# Patient Record
Sex: FEMALE | Race: WHITE | Employment: FULL TIME | URBAN - METROPOLITAN AREA
[De-identification: names, ages, dates, MRNs, and addresses within clinical notes are randomized per-mention and may not be internally consistent; named-entity substitution may affect disease eponyms.]

---

## 2017-01-31 ENCOUNTER — OFFICE VISIT (OUTPATIENT)
Dept: PEDIATRICS | Facility: CLINIC | Age: 37
End: 2017-01-31
Payer: COMMERCIAL

## 2017-01-31 VITALS
TEMPERATURE: 99.4 F | HEIGHT: 63 IN | DIASTOLIC BLOOD PRESSURE: 70 MMHG | BODY MASS INDEX: 39.16 KG/M2 | OXYGEN SATURATION: 97 % | SYSTOLIC BLOOD PRESSURE: 90 MMHG | WEIGHT: 221 LBS | HEART RATE: 108 BPM

## 2017-01-31 DIAGNOSIS — J02.0 ACUTE STREPTOCOCCAL PHARYNGITIS: Primary | ICD-10-CM

## 2017-01-31 LAB
DEPRECATED S PYO AG THROAT QL EIA: ABNORMAL
MICRO REPORT STATUS: ABNORMAL
SPECIMEN SOURCE: ABNORMAL

## 2017-01-31 PROCEDURE — 99213 OFFICE O/P EST LOW 20 MIN: CPT | Performed by: PHYSICIAN ASSISTANT

## 2017-01-31 PROCEDURE — 87880 STREP A ASSAY W/OPTIC: CPT | Performed by: PHYSICIAN ASSISTANT

## 2017-01-31 RX ORDER — AMOXICILLIN 500 MG/1
500 CAPSULE ORAL 2 TIMES DAILY
Qty: 20 CAPSULE | Refills: 0 | Status: SHIPPED | OUTPATIENT
Start: 2017-01-31 | End: 2017-05-13

## 2017-01-31 NOTE — MR AVS SNAPSHOT
"              After Visit Summary   1/31/2017    Holland Gary    MRN: [id number]           Patient Information     Date Of Birth          1980        Visit Information        Provider Department      1/31/2017 2:10 PM Teena Fields PA-C [facility]        Today's Diagnoses     Acute streptococcal pharyngitis    -  1       Care Instructions    Rapid strep test is positive.     Continue with rest and fluids. May take analgesics for symptomatic relief.    Do not share drinks/food with others. Discard toothbrush in one week.     Return if your symptoms persist or worsen.           Follow-ups after your visit        Who to contact     If you have questions or need follow up information about today's clinic visit or your schedule please contact [facility] directly at [phone].  Normal or non-critical lab and imaging results will be communicated to you by MyChart, letter or phone within 4 business days after the clinic has received the results. If you do not hear from us within 7 days, please contact the clinic through MyChart or phone. If you have a critical or abnormal lab result, we will notify you by phone as soon as possible.  Submit refill requests through Mopio or call your pharmacy and they will forward the refill request to us. Please allow 3 business days for your refill to be completed.          Additional Information About Your Visit        MyChart Information     Mopio lets you send messages to your doctor, view your test results, renew your prescriptions, schedule appointments and more. To sign up, go to [email or web address] . Click on \"Log in\" on the left side of the screen, which will take you to the Welcome page. Then click on \"Sign up Now\" on the right side of the page.     You will be asked to enter the access code listed below, as well as some personal information. Please follow the directions to create your username and password.     Your access " "code is: [email or web address]  Expires: 2017  2:56 PM     Your access code will  in 90 days. If you need help or a new code, please call your [facility] clinic or [phone].        Care EveryWhere ID     This is your Care EveryWhere ID. This could be used by other organizations to access your [facility] medical records  [id number]        Your Vitals Were     Pulse Temperature Height BMI (Body Mass Index) Pulse Oximetry       108 99.4  F (37.4  C) (Oral) 5' 3\" (1.6 m) 39.16 kg/m2 97%        Blood Pressure from Last 3 Encounters:   17 90/70   10/30/16 102/68   16 115/70    Weight from Last 3 Encounters:   17 221 lb (100.245 kg)   10/30/16 219 lb 11.2 oz (99.655 kg)   16 201 lb 6.4 oz (91.354 kg)              We Performed the Following     Strep, Rapid Screen          Today's Medication Changes          These changes are accurate as of: 17  2:56 PM.  If you have any questions, ask your nurse or doctor.               Start taking these medicines.        Dose/Directions    amoxicillin 500 MG capsule   Commonly known as:  AMOXIL   Used for:  Acute streptococcal pharyngitis   Started by:  Teena Fields PA-C        Dose:  500 mg   Take 1 capsule (500 mg) by mouth 2 times daily   Quantity:  20 capsule   Refills:  0            Where to get your medicines      These medications were sent to [facility] [address]   [address], [place] [address]     Phone:  [phone]    - amoxicillin 500 MG capsule             Primary Care Provider Office Phone # Fax #    Mindy Martin [phone]       [address] [address]        Thank you!     Thank you for choosing [facility]  for your care. Our goal is always to provide you with excellent care. Hearing back from our patients is one way we can continue to improve our services. Please take a few minutes to " complete the written survey that you may receive in the mail after your visit with us. Thank you!             Your Updated Medication List - Protect others around you: Learn how to safely use, store and throw away your medicines at www.disposemymeds.org.          This list is accurate as of: 1/31/17  2:56 PM.  Always use your most recent med list.                   Brand Name Dispense Instructions for use    * ADDERALL PO      Take 10 mg by mouth daily       * ADDERALL XR PO      Take 30 mg by mouth daily       amoxicillin 500 MG capsule    AMOXIL    20 capsule    Take 1 capsule (500 mg) by mouth 2 times daily       TYLENOL 325 MG tablet   Generic drug:  acetaminophen      Take 325-650 mg by mouth every 6 hours as needed       VENTOLIN HFA IN          WELLBUTRIN PO          * Notice:  This list has 2 medication(s) that are the same as other medications prescribed for you. Read the directions carefully, and ask your doctor or other care provider to review them with you.

## 2017-01-31 NOTE — NURSING NOTE
"Chief Complaint   Patient presents with     Pharyngitis       Initial BP 90/70 mmHg  Pulse 108  Temp(Src) 99.4  F (37.4  C) (Oral)  Ht 5' 3\" (1.6 m)  Wt 221 lb (100.245 kg)  BMI 39.16 kg/m2  SpO2 97% Estimated body mass index is 39.16 kg/(m^2) as calculated from the following:    Height as of this encounter: 5' 3\" (1.6 m).    Weight as of this encounter: 221 lb (100.245 kg).  BP completed using cuff size: adan Costa MA// January 31, 2017 2:27 PM            "

## 2017-01-31 NOTE — Clinical Note
[address] [address]  [phone]        1/31/2017      Re: Holland Gary      TO WHOM IT MAY CONCERN:    Holland Gary was seen on 1/31/17.  Please excuse her through 2/1/17 due to illness.      Sincerely,          Teena Fields PA-C

## 2017-01-31 NOTE — PROGRESS NOTES
"  SUBJECTIVE:                                                    Holland Gary is a 36 year old female who presents to clinic today for the following health issues:    Acute Illness   Acute illness concerns: sore throat, body aches   Onset: started yesterday     Fever: no, felt warm     Chills/Sweats: YES    Headache (location?): YES    Sinus Pressure:YES    Conjunctivitis:  YES: bilateral    Ear Pain: no    Rhinorrhea: no     Congestion: YES    Sore Throat: YES     Cough: no    Wheeze: no    Decreased Appetite: YES    Nausea: YES    Vomiting: no    Diarrhea:  no    Dysuria/Freq.: no    Fatigue/Achiness: YES    Sick/Strep Exposure: YES-      Therapies Tried and outcome: rest    Problem list, Medication list, Allergies, and Medical/Social/Surgical histories reviewed in [facility] and updated as appropriate.    ROS:  ROS otherwise negative    OBJECTIVE:                                                    BP 90/70 mmHg  Pulse 108  Temp(Src) 99.4  F (37.4  C) (Oral)  Ht 5' 3\" (1.6 m)  Wt 221 lb (100.245 kg)  BMI 39.16 kg/m2  SpO2 97%  Body mass index is 39.16 kg/(m^2).   GENERAL: alert, no distress  HENT: ear canals- normal; TMs- normal; Nose- normal; Mouth-erythemic posterior pharynx; no sinus tenderness   NECK: tonsillar LAD  RESP: lungs clear to auscultation - no rales, no rhonchi, no wheezes  CV: regular rates and rhythm, normal S1 S2, no S3 or S4 and no murmur, no click or rub  ABDOMEN: soft, no tenderness  SKIN: no suspicious lesions, no rashes    Diagnostic test results:  Results for orders placed or performed in visit on 01/31/17 (from the past 24 hour(s))   Strep, Rapid Screen   Result Value Ref Range    Specimen Description Throat     Rapid Strep A Screen (A)      POSITIVE: Group A Streptococcal antigen detected by immunoassay.    Micro Report Status Pending         ASSESSMENT/PLAN:                                                    (J02.0) Acute streptococcal pharyngitis  (primary encounter " diagnosis)  Comment: begin antibiotics. Limit exposures.   Plan: Strep, Rapid Screen, amoxicillin (AMOXIL) 500         MG capsule          See Patient Instructions    Teena Fields PA-C  [facility]

## 2017-05-13 ENCOUNTER — OFFICE VISIT (OUTPATIENT)
Dept: URGENT CARE | Facility: URGENT CARE | Age: 37
End: 2017-05-13
Payer: COMMERCIAL

## 2017-05-13 VITALS
DIASTOLIC BLOOD PRESSURE: 82 MMHG | OXYGEN SATURATION: 99 % | RESPIRATION RATE: 12 BRPM | TEMPERATURE: 98.9 F | HEART RATE: 54 BPM | SYSTOLIC BLOOD PRESSURE: 118 MMHG

## 2017-05-13 DIAGNOSIS — G50.0 TRIGEMINAL NEURALGIA: Primary | ICD-10-CM

## 2017-05-13 PROCEDURE — 99213 OFFICE O/P EST LOW 20 MIN: CPT | Performed by: FAMILY MEDICINE

## 2017-05-13 RX ORDER — CARBAMAZEPINE 200 MG/1
200 TABLET ORAL 2 TIMES DAILY
Qty: 28 TABLET | Refills: 0 | Status: SHIPPED | OUTPATIENT
Start: 2017-05-13 | End: 2019-03-26

## 2017-05-13 RX ORDER — OXYCODONE AND ACETAMINOPHEN 10; 325 MG/1; MG/1
1 TABLET ORAL EVERY 4 HOURS PRN
COMMUNITY
End: 2017-07-10

## 2017-05-13 NOTE — PROGRESS NOTES
SUBJECTIVE:   Holland Gary is a 36 year old female presenting with a chief complaint of right-sided facial pain (shooting, burning pain, severe pain, initially occasional but the pain has become more constant since yesterday).  Onset of symptoms was April 24, 2017.   Course of illness is worsening since yesterday. .    Severity severe.   Current and Associated symptoms: as listed above.     Patient saw a dentist for this right facial pain on May 9, 2017.  The dental exam was normal.  Patient also was evaluated by a physician on April 27, 2017.  Patient was started on Percocet and Augmentin on April 27, 2017.  The pain improved but then returned on May 6, 2017.  .      Current Outpatient Prescriptions   Medication Sig Dispense Refill     amoxicillin-clavulanate (AUGMENTIN) 875-125 MG per tablet Take 1 tablet by mouth 2 times daily       oxyCODONE-acetaminophen (PERCOCET)  MG per tablet Take 1 tablet by mouth every 4 hours as needed for moderate to severe pain       Amphetamine-Dextroamphetamine (ADDERALL PO) Take 10 mg by mouth daily       Amphetamine-Dextroamphetamine (ADDERALL XR PO) Take 30 mg by mouth daily       acetaminophen (TYLENOL) 325 MG tablet Take 325-650 mg by mouth every 6 hours as needed       BuPROPion HCl (WELLBUTRIN PO) Reported on 5/13/2017       Albuterol Sulfate (VENTOLIN HFA IN) Reported on 5/13/2017       Social History   Substance Use Topics     Smoking status: Light Tobacco Smoker     Packs/day: 0.50     Types: Cigarettes     Smokeless tobacco: Never Used     Alcohol use 0.0 oz/week     0 Standard drinks or equivalent per week      Comment: on occ       ROS:  Review of systems negative except as stated above.    OBJECTIVE  :/82 (BP Location: Right arm, Patient Position: Chair, Cuff Size: Adult Large)  Pulse 54  Temp 98.9  F (37.2  C)  Resp 12  SpO2 99%  GENERAL APPEARANCE: healthy, alert and no distress  HENT: No TMJ pain with palpation.  Teeth, gums, Oropharynx, tongue,  buccal mucosa within normal limits.    SKIN: no suspicious lesions or rashes    ASSESSMENT:  Trigeminal Neuralgia    PLAN:  Rx:  Carbamazepine.  follow up with a primary care provider in 7-10 days for further evaluation.  See orders in Epic    Kamari Watts MD

## 2017-05-13 NOTE — MR AVS SNAPSHOT
After Visit Summary   5/13/2017    Holland Gary    MRN: [id number]           Patient Information     Date Of Birth          1980        Visit Information        Provider Department      5/13/2017 2:30 PM Kamari Watts MD [facility]        Today's Diagnoses     Trigeminal neuralgia    -  1      Care Instructions      Follow up with a primary care provider in 7-10 days.   Trigeminal Neuralgia  You have trigeminal neuralgia. This is pain caused by irritation of the trigeminal nerve on your face. Symptoms include sudden, sharp pain in your head or face. It may feel like an electric shock. It can last for several seconds or minutes. It usually happens on only 1 side of your face. Pain may be triggered by things like moving your jaw or a touch on the skin of your face. The pain may be caused by something irritating the trigeminal nerve, such as a blood vessel pressing against it. But the exact cause of this problem often isn t known. Although it can be quite painful, the condition isn t dangerous.  Trigeminal neuralgia is often treated with medications. These include anti-seizure medications or antidepressants. Certain other treatments may also help. In some cases, you may need surgery.    Home care  The health care provider may prescribe medicines to help relieve and prevent pain. Take all medicines as directed. Please note that it may take several changes in dose and medicines before the right combination is found that controls the pain.  General care:    Plan to rest at home today.    Avoid any specific activities that seem to trigger the pain.    Over the next few weeks, keep a pain diary. Write down when your symptoms happen and how they feel. Certain activities such as touching your face, chewing, talking, or brushing your teeth may bring on the pain. Cold air can also trigger the pain. Make sure you write down any triggers and discuss these with your health care provider. This  will help your provider make a plan for your treatment.  Follow-up care  Follow up with your health care provider as advised. If you were referred to a neurologist, be sure to make an appointment.  For more information on your condition, visit::    Facial Pain Association www.fpa-support.org  When to seek medical advice  Call your health care provider right away if any of these occur:    Fever of 100.4 F (38 C) or higher    Headache with very stiff neck    You aren t able to keep liquids down (repeated vomiting)    Extreme drowsiness or confusion    Dizziness or fainting    A new feeling of weakness or numbness or tingling in your arm, leg, or face    Difficulty speaking or seeing       [id number] [employer]. [address] [place] [address], [place] [address]. All rights reserved. This information is not intended as a substitute for professional medical care. Always follow your healthcare professional's instructions.              Follow-ups after your visit        Who to contact     If you have questions or need follow up information about today's clinic visit or your schedule please contact [facility] directly at [phone].  Normal or non-critical lab and imaging results will be communicated to you by Kuraturhart, letter or phone within 4 business days after the clinic has received the results. If you do not hear from us within 7 days, please contact the clinic through testbirdst or phone. If you have a critical or abnormal lab result, we will notify you by phone as soon as possible.  Submit refill requests through [email or web address] or call your pharmacy and they will forward the refill request to us. Please allow 3 business days for your refill to be completed.          Additional Information About Your Visit        [email or web address] Information     [email or web address] lets you send messages to your doctor, view your test results, renew your prescriptions, schedule appointments and more. To sign up, go to  [email or web address] . Click on \"Log in\" on the left side of the screen, which will take you to the Welcome page. Then click on \"Sign up Now\" on the right side of the page.     You will be asked to enter the access code listed below, as well as some personal information. Please follow the directions to create your username and password.     Your access code is: [email or web address]  Expires: 2017  4:19 PM     Your access code will  in 90 days. If you need help or a new code, please call your [facility] clinic or [phone].        Care EveryWhere ID     This is your Care EveryWhere ID. This could be used by other organizations to access your [facility] medical records  [id number]        Your Vitals Were     Pulse Temperature Respirations Pulse Oximetry          54 98.9  F (37.2  C) 12 99%         Blood Pressure from Last 3 Encounters:   17 118/82   17 90/70   10/30/16 102/68    Weight from Last 3 Encounters:   17 221 lb (100.2 kg)   10/30/16 219 lb 11.2 oz (99.7 kg)   16 201 lb 6.4 oz (91.4 kg)              Today, you had the following     No orders found for display         Today's Medication Changes          These changes are accurate as of: 17  4:19 PM.  If you have any questions, ask your nurse or doctor.               Start taking these medicines.        Dose/Directions    carBAMazepine 200 MG tablet   Commonly known as:  TEGRETOL   Used for:  Trigeminal neuralgia   Started by:  Kamari Watts MD        Dose:  200 mg   Take 1 tablet (200 mg) by mouth 2 times daily for 14 days   Quantity:  28 tablet   Refills:  0            Where to get your medicines      These medications were sent to [facility] [address] [facility]   [address] [address]     Phone:  [phone]     carBAMazepine 200 MG tablet                Primary Care Provider Office Phone # Fax #    Mindy STEEN Veronica [phone]       [facility] [address] [place] [address]        Thank you!     Thank you for choosing [facility]  for your care. Our goal is always to provide you with excellent care. Hearing back from our patients is one way we can continue to improve our services. Please take a few minutes to complete the written survey that you may receive in the mail after your visit with us. Thank you!             Your Updated Medication List - Protect others around you: Learn how to safely use, store and throw away your medicines at www.disposemymeds.org.          This list is accurate as of: 5/13/17  4:19 PM.  Always use your most recent med list.                   Brand Name Dispense Instructions for use    * ADDERALL PO      Take 10 mg by mouth daily       * ADDERALL XR PO      Take 30 mg by mouth daily       amoxicillin-clavulanate 875-125 MG per tablet    AUGMENTIN     Take 1 tablet by mouth 2 times daily       carBAMazepine 200 MG tablet    TEGRETOL    28 tablet    Take 1 tablet (200 mg) by mouth 2 times daily for 14 days       oxyCODONE-acetaminophen  MG per tablet    PERCOCET     Take 1 tablet by mouth every 4 hours as needed for moderate to severe pain       TYLENOL 325 MG tablet   Generic drug:  acetaminophen      Take 325-650 mg by mouth every 6 hours as needed       VENTOLIN HFA IN      Reported on 5/13/2017       WELLBUTRIN PO      Reported on 5/13/2017       * Notice:  This list has 2 medication(s) that are the same as other medications prescribed for you. Read the directions carefully, and ask your doctor or other care provider to review them with you.

## 2017-05-13 NOTE — NURSING NOTE
"Chief Complaint   Patient presents with     Urgent Care     Facial Pain     Right sided facial pain. Patient has been to a doctor and dentist. There is still no relief after trying antibiotics and pain medication.      Initial /82 (BP Location: Right arm, Patient Position: Chair, Cuff Size: Adult Large)  Pulse 54  Temp 98.9  F (37.2  C)  Resp 12  SpO2 99% Estimated body mass index is 39.15 kg/(m^2) as calculated from the following:    Height as of 1/31/17: 5' 3\" (1.6 m).    Weight as of 1/31/17: 221 lb (100.2 kg)..  BP completed using cuff size: large  S AL, CMA    "

## 2017-07-10 ENCOUNTER — OFFICE VISIT (OUTPATIENT)
Dept: PEDIATRICS | Facility: CLINIC | Age: 37
End: 2017-07-10
Payer: COMMERCIAL

## 2017-07-10 ENCOUNTER — RADIANT APPOINTMENT (OUTPATIENT)
Dept: GENERAL RADIOLOGY | Facility: CLINIC | Age: 37
End: 2017-07-10
Attending: INTERNAL MEDICINE
Payer: COMMERCIAL

## 2017-07-10 VITALS
HEART RATE: 74 BPM | DIASTOLIC BLOOD PRESSURE: 72 MMHG | SYSTOLIC BLOOD PRESSURE: 100 MMHG | TEMPERATURE: 98.1 F | BODY MASS INDEX: 39.71 KG/M2 | OXYGEN SATURATION: 96 % | HEIGHT: 63 IN | WEIGHT: 224.1 LBS

## 2017-07-10 DIAGNOSIS — G50.0 TRIGEMINAL NEURALGIA: ICD-10-CM

## 2017-07-10 DIAGNOSIS — E66.09 NON MORBID OBESITY DUE TO EXCESS CALORIES: ICD-10-CM

## 2017-07-10 DIAGNOSIS — R63.5 WEIGHT GAIN: ICD-10-CM

## 2017-07-10 DIAGNOSIS — R79.82 ELEVATED C-REACTIVE PROTEIN: Primary | ICD-10-CM

## 2017-07-10 DIAGNOSIS — Z80.3 FAMILY HISTORY OF MALIGNANT NEOPLASM OF BREAST: ICD-10-CM

## 2017-07-10 LAB — ERYTHROCYTE [SEDIMENTATION RATE] IN BLOOD BY WESTERGREN METHOD: 12 MM/H (ref 0–20)

## 2017-07-10 PROCEDURE — 84145 PROCALCITONIN (PCT): CPT | Performed by: INTERNAL MEDICINE

## 2017-07-10 PROCEDURE — 82533 TOTAL CORTISOL: CPT | Performed by: INTERNAL MEDICINE

## 2017-07-10 PROCEDURE — 87389 HIV-1 AG W/HIV-1&-2 AB AG IA: CPT | Performed by: INTERNAL MEDICINE

## 2017-07-10 PROCEDURE — 99215 OFFICE O/P EST HI 40 MIN: CPT | Performed by: INTERNAL MEDICINE

## 2017-07-10 PROCEDURE — 84443 ASSAY THYROID STIM HORMONE: CPT | Performed by: INTERNAL MEDICINE

## 2017-07-10 PROCEDURE — 71020 XR CHEST 2 VW: CPT

## 2017-07-10 PROCEDURE — 36415 COLL VENOUS BLD VENIPUNCTURE: CPT | Performed by: INTERNAL MEDICINE

## 2017-07-10 PROCEDURE — 86140 C-REACTIVE PROTEIN: CPT | Performed by: INTERNAL MEDICINE

## 2017-07-10 PROCEDURE — 85652 RBC SED RATE AUTOMATED: CPT | Performed by: INTERNAL MEDICINE

## 2017-07-10 RX ORDER — CYCLOBENZAPRINE HCL 10 MG
10 TABLET ORAL
COMMUNITY
Start: 2017-05-26 | End: 2019-03-26

## 2017-07-10 RX ORDER — CARBAMAZEPINE 200 MG/1
200 TABLET ORAL PRN
COMMUNITY
End: 2021-11-30

## 2017-07-10 NOTE — MR AVS SNAPSHOT
"              After Visit Summary   7/10/2017    Holladn Gary    MRN: [id number]           Patient Information     Date Of Birth          1980        Visit Information        Provider Department      7/10/2017 2:40 PM [facility] MD [facility]        Today's Diagnoses     Elevated C-reactive protein    -  1    Trigeminal neuralgia        Weight gain          Care Instructions    1. Chest x-ray looks ok  2. Labs today: CRP, Sed rate (longer term measure of inflammation), procalcitonin (should be elevated if infection), HIV, thyroid function and cortisol level  3. If labs otherwise normal and CRP keeps increasing, would consider mammogram with family history of breast cancer vs waiting another month and rechecking.          Follow-ups after your visit        Who to contact     If you have questions or need follow up information about today's clinic visit or your schedule please contact [facility] directly at [phone].  Normal or non-critical lab and imaging results will be communicated to you by MyChart, letter or phone within 4 business days after the clinic has received the results. If you do not hear from us within 7 days, please contact the clinic through Razerhart or phone. If you have a critical or abnormal lab result, we will notify you by phone as soon as possible.  Submit refill requests through [employer] or call your pharmacy and they will forward the refill request to us. Please allow 3 business days for your refill to be completed.          Additional Information About Your Visit        MyChart Information     [employer] lets you send messages to your doctor, view your test results, renew your prescriptions, schedule appointments and more. To sign up, go to [email or web address] . Click on \"Log in\" on the left side of the screen, which will take you to the Welcome page. Then click on \"Sign up Now\" on the right side of the page.     You will be asked to enter the " "access code listed below, as well as some personal information. Please follow the directions to create your username and password.     Your access code is: [email or web address]  Expires: 2017  4:19 PM     Your access code will  in 90 days. If you need help or a new code, please call your [place] clinic or [phone].        Care EveryWhere ID     This is your Care EveryWhere ID. This could be used by other organizations to access your [place] medical records  [id number]        Your Vitals Were     Pulse Temperature Height Pulse Oximetry BMI (Body Mass Index)       74 98.1  F (36.7  C) (Tympanic) 5' 3\" (1.6 m) 96% 39.7 kg/m2        Blood Pressure from Last 3 Encounters:   07/10/17 100/72   17 118/82   17 90/70    Weight from Last 3 Encounters:   07/10/17 224 lb 1.6 oz (101.7 kg)   17 221 lb (100.2 kg)   10/30/16 219 lb 11.2 oz (99.7 kg)              We Performed the Following     Cortisol     CRP inflammation     Erythrocyte sedimentation rate auto     HIV Antigen Antibody Combo     Procalcitonin     TSH with free T4 reflex        Primary Care Provider Office Phone # Fax #    Mindy Martin [phone]       [place] [address]        Equal Access to Services     MUKESH SRINIVASAN : Hadii blossom ku hadasho Sotulioali, waaxda luqadaha, qaybta kaalmada adetaina, letha cannon . So [facility] [phone].    ATENCIÓN: Si habla español, tiene a miller disposición servicios gratuitos de asistencia lingüística. Llame al [phone].    We comply with applicable federal civil rights laws and [place] laws. We do not discriminate on the basis of race, color, national origin, age, disability sex, sexual orientation or gender identity.            Thank you!     Thank you for choosing [facility] [place]  for your care. Our goal is always to provide you with excellent care. Hearing back from our patients is one way we can continue " to improve our services. Please take a few minutes to complete the written survey that you may receive in the mail after your visit with us. Thank you!             Your Updated Medication List - Protect others around you: Learn how to safely use, store and throw away your medicines at www.disposemymeds.org.          This list is accurate as of: 7/10/17  4:19 PM.  Always use your most recent med list.                   Brand Name Dispense Instructions for use Diagnosis    * ADDERALL PO      Take 10 mg by mouth daily        * ADDERALL XR PO      Take 30 mg by mouth daily        * carBAMazepine 200 MG tablet    TEGretol     Take 200 mg by mouth        * carBAMazepine 200 MG tablet    TEGRETOL    28 tablet    Take 1 tablet (200 mg) by mouth 2 times daily for 14 days    Trigeminal neuralgia       cyclobenzaprine 10 MG tablet    FLEXERIL     Take 10 mg by mouth        TYLENOL 325 MG tablet   Generic drug:  acetaminophen      Take 325-650 mg by mouth every 6 hours as needed        VENTOLIN HFA IN      Reported on 5/13/2017        vitamin D3 1000 UNITS Caps      Take 2,000 Units by mouth        * Notice:  This list has 4 medication(s) that are the same as other medications prescribed for you. Read the directions carefully, and ask your doctor or other care provider to review them with you.
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
## 2017-07-10 NOTE — PATIENT INSTRUCTIONS
1. Chest x-ray looks ok  2. Labs today: CRP, Sed rate (longer term measure of inflammation), procalcitonin (should be elevated if infection), HIV, thyroid function and cortisol level  3. If labs otherwise normal and CRP keeps increasing, would consider mammogram with family history of breast cancer vs waiting another month and rechecking.

## 2017-07-10 NOTE — NURSING NOTE
"Chief Complaint   Patient presents with     RECHECK     CRP levels       Initial /72 (BP Location: Right arm, Patient Position: Chair, Cuff Size: Adult Large)  Pulse 74  Temp 98.1  F (36.7  C) (Tympanic)  Ht 5' 3\" (1.6 m)  Wt 224 lb 1.6 oz (101.7 kg)  SpO2 96%  BMI 39.7 kg/m2 Estimated body mass index is 39.7 kg/(m^2) as calculated from the following:    Height as of this encounter: 5' 3\" (1.6 m).    Weight as of this encounter: 224 lb 1.6 oz (101.7 kg).  Medication Reconciliation: complete   Marti Mims LPN      "

## 2017-07-11 PROBLEM — E66.09 NON MORBID OBESITY DUE TO EXCESS CALORIES: Status: ACTIVE | Noted: 2017-07-11

## 2017-07-11 LAB
CORTIS SERPL-MCNC: 4.4 UG/DL (ref 4–22)
CRP SERPL-MCNC: 13 MG/L (ref 0–8)
HIV 1+2 AB+HIV1 P24 AG SERPL QL IA: NORMAL
PROCALCITONIN SERPL-MCNC: <0.05 NG/ML

## 2017-07-13 LAB — TSH SERPL DL<=0.005 MIU/L-ACNC: 1.94 MU/L (ref 0.4–4)

## 2017-07-15 ENCOUNTER — HEALTH MAINTENANCE LETTER (OUTPATIENT)
Age: 37
End: 2017-07-15

## 2017-07-17 PROBLEM — Z80.3 FAMILY HISTORY OF MALIGNANT NEOPLASM OF BREAST: Status: ACTIVE | Noted: 2017-07-17

## 2017-12-27 ENCOUNTER — OFFICE VISIT (OUTPATIENT)
Dept: PEDIATRICS | Facility: CLINIC | Age: 37
End: 2017-12-27
Payer: COMMERCIAL

## 2017-12-27 VITALS
HEIGHT: 65 IN | OXYGEN SATURATION: 94 % | SYSTOLIC BLOOD PRESSURE: 116 MMHG | RESPIRATION RATE: 14 BRPM | BODY MASS INDEX: 39.1 KG/M2 | WEIGHT: 234.7 LBS | DIASTOLIC BLOOD PRESSURE: 72 MMHG | TEMPERATURE: 98.2 F | HEART RATE: 78 BPM

## 2017-12-27 DIAGNOSIS — J20.9 ACUTE BRONCHITIS, UNSPECIFIED ORGANISM: Primary | ICD-10-CM

## 2017-12-27 PROCEDURE — 99214 OFFICE O/P EST MOD 30 MIN: CPT | Performed by: PHYSICIAN ASSISTANT

## 2017-12-27 RX ORDER — AZITHROMYCIN 250 MG/1
TABLET, FILM COATED ORAL
Qty: 6 TABLET | Refills: 0 | Status: SHIPPED | OUTPATIENT
Start: 2017-12-27 | End: 2018-04-19

## 2017-12-27 NOTE — LETTER
December 27, 2017      Holland Gary  [address] [place] [address]        To Whom It May Concern:    Holland Gary  was seen on 12/27/17.  Please excuse her through 12/28/17 due to illness.        Sincerely,        Teena Fields PA-C

## 2017-12-27 NOTE — PROGRESS NOTES
"  SUBJECTIVE:   Holland Gary is a 37 year old female who presents to clinic today for the following health issues:    Acute Illness   Acute illness concerns: URI symptoms  Onset: 2 days ago    Fever: no    Chills/Sweats: YES    Headache (location?): YES    Sinus Pressure:YES- tender, nasal drip    Conjunctivitis:  no    Ear Pain: YES: right    Rhinorrhea: YES- slight    Congestion: YES    Sore Throat: YES     Cough: YES-non-productive, barking    Wheeze: no    No sob    Decreased Appetite: YES    Nausea: no    Vomiting: no    Diarrhea:  no    Dysuria/Freq.: no    Fatigue/Achiness: YES    Sick/Strep Exposure: YES- small child with cough and runny nose     Therapies Tried and outcome: dayquil, sinus medication (unsure which) cough drops, dayquil helped the most    No history of asthma, allergies.   Smoker.   Works in HR--office setting.     ROS:  ROS otherwise negative    OBJECTIVE:                                                    /72 (BP Location: Right arm, Patient Position: Chair, Cuff Size: Adult Large)  Pulse 78  Temp 98.2  F (36.8  C) (Oral)  Resp 14  Ht 5' 5\" (1.651 m)  Wt 234 lb 11.2 oz (106.5 kg)  SpO2 94%  BMI 39.06 kg/m2  Body mass index is 39.06 kg/(m^2).   GENERAL: alert, no distress  HENT: ear canals- normal; TMs- normal; Nose- normal; Mouth- no ulcers, no lesions  NECK: no tenderness, no adenopathy  RESP: mild-mod diminished breath sounds- no rales, no rhonchi, no wheezes  CV: regular rates and rhythm, normal S1 S2, no S3 or S4 and no murmur, no click or rub    Diagnostic test results:  No results found for this or any previous visit (from the past 24 hour(s)).       ASSESSMENT/PLAN:                                                    (J20.9) Acute bronchitis, unspecified organism  (primary encounter diagnosis)  Comment: proceed with antibiotics if symptoms persist or worsen considerably. Patient in agreement with plan.  Plan: azithromycin (ZITHROMAX) 250 MG tablet          See Patient " Instructions    Teena Fields PA-C  [facility]

## 2017-12-27 NOTE — PATIENT INSTRUCTIONS
Acute Bronchitis                  What is acute bronchitis?   Bronchitis is an infection of the air passages--that is, the tubes that connect the windpipe to the lungs. It causes swelling and irritation of the airways. With acute bronchitis you usually have a cough that produces phlegm and pain behind the breastbone when you breathe deeply or cough.   How does it occur?   Bronchitis often occurs with viral infections of the respiratory tract, such as colds and flu. Bronchitis may also be caused by bacterial infections. It may occur with childhood illnesses such as measles and whooping cough.   Attacks are most frequent during the winter or when the level of air pollution is high.   Infants, young children, older adults, smokers, and people with heart disease or lung disease (including asthma and allergies) are most likely to get acute bronchitis.   What are the symptoms?   Symptoms may include:   a deep cough that produces yellowish or greenish phlegm   pain behind the breastbone when you breathe deeply or cough   wheezing   feeling short of breath   fever   chills   headache   sore muscles.   How is it diagnosed?   Your healthcare provider will ask about your symptoms and examine you. You may have tests, such as:   a test of phlegm to look for bacteria   chest X-ray   blood tests.   How is it treated?   Acute bronchitis often does not require medical treatment. Resting at home and drinking plenty of fluids to keep the mucus loose may be all you need to do to get better in a few days. If your symptoms are severe or you have other health problems (such as heart or lung disease or diabetes), you may need to take antibiotics.   How long will the effects last?   Most of the time acute bronchitis clears up in a few days. Your cough may slowly get better in 1 to 2 weeks.   It may take you longer to recover if:   You are a smoker.   You live in an area where air pollution is a problem.   You have a heart  or lung disease.   You have any other continuing health problems.   How can I take care of myself?   You can help yourself by:   following the full treatment your healthcare provider recommends   using a vaporizer, humidifier, or steam from hot water to add moisture to the air   drinking plenty of liquids   taking cough medicine if recommended by your healthcare provider   resting in bed   taking aspirin or acetaminophen to reduce fever and relieve headache and muscle pain (Check with your healthcare provider before you give any medicine that contains aspirin or salicylates to a child or teen. This includes medicines like baby aspirin, some cold medicines, and Pepto Bismol. Children and teens who take aspirin are at risk for a serious illness called Reye's syndrome.)   eating healthy meals.   Call your healthcare provider if:   You have trouble breathing.   You have a fever of 101.5?F (38.6?C) or higher.   You cough up blood.   Your symptoms are getting worse instead of better.   You don't start to feel better after 3 days of treatment.   You have any symptoms that concern you.   How can I help prevent acute bronchitis?   To reduce your risk of getting a respiratory infection:   Do not smoke.   Wash your hands often, especially when you are around people with colds (upper respiratory infections).   If you have asthma or allergies, keep your symptoms under good control.   Get regular exercise.   Eat healthy foods.       Published by [employer].  This content is reviewed periodically and is subject to change as new health information becomes available. The information is intended to inform and educate and is not a replacement for medical evaluation, advice, diagnosis or treatment by a healthcare professional.   Developed by [employer].   ? 2010 [employer] and/or its affiliates. All Rights Reserved.   Copyright   Clinical Reference Systems 2011

## 2017-12-27 NOTE — MR AVS SNAPSHOT
After Visit Summary   12/27/2017    Holland Gary    MRN: [id number]           Patient Information     Date Of Birth          1980        Visit Information        Provider Department      12/27/2017 1:50 PM Teena Fields PA-C [facility]        Today's Diagnoses     Acute bronchitis, unspecified organism    -  1      Care Instructions                       Acute Bronchitis                  What is acute bronchitis?   Bronchitis is an infection of the air passages--that is, the tubes that connect the windpipe to the lungs. It causes swelling and irritation of the airways. With acute bronchitis you usually have a cough that produces phlegm and pain behind the breastbone when you breathe deeply or cough.   How does it occur?   Bronchitis often occurs with viral infections of the respiratory tract, such as colds and flu. Bronchitis may also be caused by bacterial infections. It may occur with childhood illnesses such as measles and whooping cough.   Attacks are most frequent during the winter or when the level of air pollution is high.   Infants, young children, older adults, smokers, and people with heart disease or lung disease (including asthma and allergies) are most likely to get acute bronchitis.   What are the symptoms?   Symptoms may include:   a deep cough that produces yellowish or greenish phlegm   pain behind the breastbone when you breathe deeply or cough   wheezing   feeling short of breath   fever   chills   headache   sore muscles.   How is it diagnosed?   Your healthcare provider will ask about your symptoms and examine you. You may have tests, such as:   a test of phlegm to look for bacteria   chest X-ray   blood tests.   How is it treated?   Acute bronchitis often does not require medical treatment. Resting at home and drinking plenty of fluids to keep the mucus loose may be all you need to do to get better in a few days. If your symptoms are severe or you  have other health problems (such as heart or lung disease or diabetes), you may need to take antibiotics.   How long will the effects last?   Most of the time acute bronchitis clears up in a few days. Your cough may slowly get better in 1 to 2 weeks.   It may take you longer to recover if:   You are a smoker.   You live in an area where air pollution is a problem.   You have a heart or lung disease.   You have any other continuing health problems.   How can I take care of myself?   You can help yourself by:   following the full treatment your healthcare provider recommends   using a vaporizer, humidifier, or steam from hot water to add moisture to the air   drinking plenty of liquids   taking cough medicine if recommended by your healthcare provider   resting in bed   taking aspirin or acetaminophen to reduce fever and relieve headache and muscle pain (Check with your healthcare provider before you give any medicine that contains aspirin or salicylates to a child or teen. This includes medicines like baby aspirin, some cold medicines, and Pepto Bismol. Children and teens who take aspirin are at risk for a serious illness called Reye's syndrome.)   eating healthy meals.   Call your healthcare provider if:   You have trouble breathing.   You have a fever of 101.5?F (38.6?C) or higher.   You cough up blood.   Your symptoms are getting worse instead of better.   You don't start to feel better after 3 days of treatment.   You have any symptoms that concern you.   How can I help prevent acute bronchitis?   To reduce your risk of getting a respiratory infection:   Do not smoke.   Wash your hands often, especially when you are around people with colds (upper respiratory infections).   If you have asthma or allergies, keep your symptoms under good control.   Get regular exercise.   Eat healthy foods.       Published by [employer].  This content is reviewed periodically and is subject to change as new health information  "becomes available. The information is intended to inform and educate and is not a replacement for medical evaluation, advice, diagnosis or treatment by a healthcare professional.   Developed by [employer].   ? 2010 [employer] and/or its affiliates. All Rights Reserved.   WalkMe   Clinical [employer] 2011                  Follow-ups after your visit        Who to contact     If you have questions or need follow up information about today's clinic visit or your schedule please contact [facility] directly at [phone].  Normal or non-critical lab and imaging results will be communicated to you by [employer], letter or phone within 4 business days after the clinic has received the results. If you do not hear from us within 7 days, please contact the clinic through GetNotest or phone. If you have a critical or abnormal lab result, we will notify you by phone as soon as possible.  Submit refill requests through [employer] or call your pharmacy and they will forward the refill request to us. Please allow 3 business days for your refill to be completed.          Additional Information About Your Visit        Nom[employer]ity Information     [employer] gives you secure access to your electronic health record. If you see a primary care provider, you can also send messages to your care team and make appointments. If you have questions, please call your primary care clinic.  If you do not have a primary care provider, please call [phone] and they will assist you.        Care EveryWhere ID     This is your Care EveryWhere ID. This could be used by other organizations to access your [facility] medical records  [id number]        Your Vitals Were     Pulse Temperature Respirations Height Pulse Oximetry BMI (Body Mass Index)    78 98.2  F (36.8  C) (Oral) 14 5' 5\" (1.651 m) 94% 39.06 kg/m2       Blood Pressure from Last 3 Encounters:   12/27/17 116/72   07/10/17 100/72   05/13/17 118/82    Weight from Last 3 Encounters: "   12/27/17 234 lb 11.2 oz (106.5 kg)   07/10/17 224 lb 1.6 oz (101.7 kg)   01/31/17 221 lb (100.2 kg)              Today, you had the following     No orders found for display         Today's Medication Changes          These changes are accurate as of: 12/27/17  2:15 PM.  If you have any questions, ask your nurse or doctor.               Start taking these medicines.        Dose/Directions    azithromycin 250 MG tablet   Commonly known as:  ZITHROMAX   Used for:  Acute bronchitis, unspecified organism   Started by:  Teena Fields PA-C        Two tablets first day, then one tablet daily for four days.   Quantity:  6 tablet   Refills:  0            Where to get your medicines      These medications were sent to [id number] - [place], [place] - [address] AT [facility] & [facility]  [address] [place] [address]     Phone:  [phone]     azithromycin 250 MG tablet                Primary Care Provider Office Phone # Fax #    Mindy STEEN Veronica [phone]       [address] [address]        Equal Access to Services     [facility]: Hadii aad rocio hadasho Sotulioali, waaxda luqadaha, qaybta kaalmada adeegyada, letha cannon . So [facility] [phone].    ATENCIÓN: Si habla español, tiene a miller disposición servicios gratuitos de asistencia lingüística. Llame al [phone].    We comply with applicable federal civil rights laws and [place] laws. We do not discriminate on the basis of race, color, national origin, age, disability, sex, sexual orientation, or gender identity.            Thank you!     Thank you for choosing [facility]  for your care. Our goal is always to provide you with excellent care. Hearing back from our patients is one way we can continue to improve our services. Please take a few minutes to complete the written survey that you may receive in the mail after your visit with us. Thank  you!             Your Updated Medication List - Protect others around you: Learn how to safely use, store and throw away your medicines at www.disposemymeds.org.          This list is accurate as of: 12/27/17  2:15 PM.  Always use your most recent med list.                   Brand Name Dispense Instructions for use Diagnosis    * ADDERALL PO      Take 10 mg by mouth daily        * ADDERALL XR PO      Take 30 mg by mouth daily        azithromycin 250 MG tablet    ZITHROMAX    6 tablet    Two tablets first day, then one tablet daily for four days.    Acute bronchitis, unspecified organism       carBAMazepine 200 MG tablet    TEGretol     Take 200 mg by mouth        cyclobenzaprine 10 MG tablet    FLEXERIL     Take 10 mg by mouth        TYLENOL 325 MG tablet   Generic drug:  acetaminophen      Take 325-650 mg by mouth every 6 hours as needed        VENTOLIN HFA IN      Reported on 5/13/2017        vitamin D3 1000 UNITS Caps      Take 2,000 Units by mouth        * Notice:  This list has 2 medication(s) that are the same as other medications prescribed for you. Read the directions carefully, and ask your doctor or other care provider to review them with you.

## 2017-12-27 NOTE — NURSING NOTE
"Chief Complaint   Patient presents with     URI       Initial /72 (BP Location: Right arm, Patient Position: Chair, Cuff Size: Adult Large)  Pulse 78  Temp 98.2  F (36.8  C) (Oral)  Resp 14  Ht 5' 5\" (1.651 m)  Wt 234 lb 11.2 oz (106.5 kg)  SpO2 94%  BMI 39.06 kg/m2 Estimated body mass index is 39.06 kg/(m^2) as calculated from the following:    Height as of this encounter: 5' 5\" (1.651 m).    Weight as of this encounter: 234 lb 11.2 oz (106.5 kg).  Medication Reconciliation: complete  Carmen Weller CMA  "

## 2018-04-19 ENCOUNTER — OFFICE VISIT (OUTPATIENT)
Dept: ORTHOPEDICS | Facility: CLINIC | Age: 38
End: 2018-04-19
Payer: COMMERCIAL

## 2018-04-19 ENCOUNTER — RADIANT APPOINTMENT (OUTPATIENT)
Dept: GENERAL RADIOLOGY | Facility: CLINIC | Age: 38
End: 2018-04-19
Attending: FAMILY MEDICINE
Payer: COMMERCIAL

## 2018-04-19 VITALS
WEIGHT: 230 LBS | HEIGHT: 65 IN | SYSTOLIC BLOOD PRESSURE: 132 MMHG | BODY MASS INDEX: 38.32 KG/M2 | DIASTOLIC BLOOD PRESSURE: 80 MMHG

## 2018-04-19 DIAGNOSIS — M25.562 ARTHRALGIA OF LEFT LOWER LEG: Primary | ICD-10-CM

## 2018-04-19 DIAGNOSIS — M21.42 PES PLANUS OF LEFT FOOT: ICD-10-CM

## 2018-04-19 DIAGNOSIS — M25.562 ARTHRALGIA OF LEFT LOWER LEG: ICD-10-CM

## 2018-04-19 DIAGNOSIS — M22.2X2 PATELLOFEMORAL PAIN SYNDROME OF LEFT KNEE: ICD-10-CM

## 2018-04-19 DIAGNOSIS — R29.898 WEAKNESS OF LEFT HIP: ICD-10-CM

## 2018-04-19 PROCEDURE — 99203 OFFICE O/P NEW LOW 30 MIN: CPT | Performed by: FAMILY MEDICINE

## 2018-04-19 PROCEDURE — 73562 X-RAY EXAM OF KNEE 3: CPT

## 2018-04-19 RX ORDER — ESCITALOPRAM OXALATE 10 MG/1
10 TABLET ORAL
COMMUNITY
Start: 2018-02-06 | End: 2019-08-20

## 2018-04-19 NOTE — MR AVS SNAPSHOT
After Visit Summary   4/19/2018    Holland Gary    MRN: [id number]           Patient Information     Date Of Birth          1980        Visit Information        Provider Department      4/19/2018 9:20 AM Gamaliel Ryan, DO [facility] SP[facility]TS MEDIC[facility]        Today's Diagnoses     Arthralgia of left lower leg    -  1    Weakness of left hip        Patellofemoral pain syndrome of left knee        Pes planus of left foot          Care Instructions    1. Arthralgia of left lower leg    2. Weakness of left hip    3. Patellofemoral pain syndrome of left knee    4. Pes planus of left foot      Discussed xray - no significant arthritis  Discussed exam - pain due to weakness in thigh, hip and buttock. Knee is otherwise stable and no fluid in the joint  Activity modification as discussed - squats, lunges, stair, sit-to-stand and kneeling will bother you  Physical therapy: [facility] - [phone]  Can take up to 8 weeks to change your strength  Respect 6/10 pain - before, during, after    Follow up after 8 PT sessions  or sooner if needed. Call direct clinic number [[phone]] at any time with questions or concerns.    NEW WEBSITE HAS LAUNCHED  [email or web address]    We care about your feedback and use it to improve our services. Please leave feedback on the Testimonials & Reviews page.              Follow-ups after your visit        Additional Services     MAU PT, HAND, AND CHIROPRACTIC REFERRAL       **This order will print in the [facility] Scheduling Office**    Physical Therapy, Hand Therapy and Chiropractic Care are available through:    *[facility]  *[facility]  *[facility]    Call one number to schedule at any of the above locations: [phone].    Your provider has referred you to: Physical Therapy at [facility] or [facility]    Indication/Reason for Referral: Left PFPS - address hip stability / VMO. Also has b/l  pes planus with poor ankle proprioception  Onset of Illness: see chart  Therapy Orders: Evaluate and Treat  Special Programs: None  Special Request: kinesioptaping.    Melecio Majano      Additional Comments for the Therapist or Chiropractor: Formal physical therapy - exercises to include neuromuscular/proprioceptive control and VMO/adductor / glutues strengthening. Please also include pain-free closed chain/isometric/isotonic strengthening with use of modalities (including kinesioptaping) as needed/deemed appropriate with home exercise prescription.      Please be aware that coverage of these services is subject to the terms and limitations of your health insurance plan.  Call member services at your health plan with any benefit or coverage questions.      Please bring the following to your appointment:    *Your personal calendar for scheduling future appointments  *Comfortable clothing                  Who to contact     If you have questions or need follow up information about today's clinic visit or your schedule please contact [facility] SP[facility]TS MEDICINE directly at [phone].  Normal or non-critical lab and imaging results will be communicated to you by [employer], letter or phone within 4 business days after the clinic has received the results. If you do not hear from us within 7 days, please contact the clinic through [employer] or phone. If you have a critical or abnormal lab result, we will notify you by phone as soon as possible.  Submit refill requests through [employer] or call your pharmacy and they will forward the refill request to us. Please allow 3 business days for your refill to be completed.          Additional Information About Your Visit        [employer] Information     [employer] gives you secure access to your electronic health record. If you see a primary care provider, you can also send messages to your care team and make appointments. If you have questions, please call your primary care clinic.   "If you do not have a primary care provider, please call [phone] and they will assist you.        Care EveryWhere ID     This is your Care EveryWhere ID. This could be used by other organizations to access your [facility] medical records  [id number]        Your Vitals Were     Height BMI (Body Mass Index)                5' 5\" (1.651 m) 38.27 kg/m2           Blood Pressure from Last 3 Encounters:   04/19/18 132/80   12/27/17 116/72   07/10/17 100/72    Weight from Last 3 Encounters:   04/19/18 230 lb (104.3 kg)   12/27/17 234 lb 11.2 oz (106.5 kg)   07/10/17 224 lb 1.6 oz (101.7 kg)              We Performed the Following     MAU PT, HAND, AND CHIROPRACTIC REFERRAL          Today's Medication Changes          These changes are accurate as of 4/19/18 10:29 AM.  If you have any questions, ask your nurse or doctor.               Stop taking these medicines if you haven't already. Please contact your care team if you have questions.     azithromycin 250 MG tablet   Commonly known as:  ZITHROMAX   Stopped by:  Gamaliel Ryan,                     Primary Care Provider Office Phone # Fax #    Mindy STEEN Martin [phone]       [place] [address]        Equal Access to Services     MUKESH SRINIVASAN AH: Hadii blossom ku hadasho Sotulioali, waaxda luqadaha, qaybta kaalmada talib, letha drake. So [facility] [phone].    ATENCIÓN: Si habla español, tiene a imller disposición servicios gratuitos de asistencia lingüística. Llame al [phone].    We comply with applicable federal civil rights laws and [place] laws. We do not discriminate on the basis of race, color, national origin, age, disability, sex, sexual orientation, or gender identity.            Thank you!     Thank you for choosing [facility] [place]  for your care. Our goal is always to provide you with excellent care. Hearing back from our patients is one way we can " continue to improve our services. Please take a few minutes to complete the written survey that you may receive in the mail after your visit with us. Thank you!             Your Updated Medication List - Protect others around you: Learn how to safely use, store and throw away your medicines at www.disposemymeds.org.          This list is accurate as of 4/19/18 10:29 AM.  Always use your most recent med list.                   Brand Name Dispense Instructions for use Diagnosis    * ADDERALL PO      Take 10 mg by mouth daily        * ADDERALL XR PO      Take 30 mg by mouth daily        carBAMazepine 200 MG tablet    TEGretol     Take 200 mg by mouth        cyclobenzaprine 10 MG tablet    FLEXERIL     Take 10 mg by mouth        escitalopram 10 MG tablet    LEXAPRO     Take 10 mg by mouth        TYLENOL 325 MG tablet   Generic drug:  acetaminophen      Take 325-650 mg by mouth every 6 hours as needed        VENTOLIN HFA IN      Reported on 5/13/2017        vitamin D3 1000 units Caps      Take 2,000 Units by mouth        * Notice:  This list has 2 medication(s) that are the same as other medications prescribed for you. Read the directions carefully, and ask your doctor or other care provider to review them with you.

## 2018-04-19 NOTE — LETTER
4/19/2018         RE: Holland Gary  [address]  [place] [address]        Dear Colleague,    Thank you for referring your patient, Holland Gary, to the [facility] SPORTS MEDICINE. Please see a copy of my visit note below.    ASSESSMENT & PLAN    1. Arthralgia of left lower leg    2. Weakness of left hip    3. Patellofemoral pain syndrome of left knee    4. Pes planus of left foot      Discussed xray - no significant arthritis  Discussed exam - pain due to weakness in thigh, hip and buttock. Knee is otherwise stable and no fluid in the joint  Activity modification as discussed - squats, lunges, stair, sit-to-stand and kneeling will bother you  Physical therapy: [facility] - [phone]  Can take up to 8 weeks to change your strength  Respect 6/10 pain - before, during, after    Follow up after 8 PT sessions  or sooner if needed. Call direct clinic number [[phone]] at any time with questions or concerns.    -----    SUBJECTIVE  Holland Gary is a/an 37 year old female who is seen as a self referral for evaluation of left knee pain. The patient is seen by themselves.    Onset: 1-2 month(s) ago. Reports insidious onset without acute precipitating event.  Location of Pain: left medial knee with radiation to the lower leg, also feeling pain on the underside of the patella  Rating of Pain at worst: 7/10  Rating of Pain Currently: 2/10  Worsened by: stairs, crossing her legs, walking on uneven ground  Better with: rest  Treatments tried: bracing, topical ointments  Associated symptoms: possible posterior knee swelling  Orthopedic history:  pes planus, restless leg syndrome, seen at [facility] last year for similar knee issues and diagnosed with Bakers Cyst and referred for MRI but patient didn't complete  Relevant surgical history: NO  Patient Social History: works in [employer] job    Patient's past medical, surgical, social, and family histories were reviewed today and no  "changes are noted.    REVIEW OF SYSTEMS:  10 point ROS is negative other than symptoms noted above in HPI, Past Medical History or as stated below  Constitutional: NEGATIVE for fever, chills, change in weight  Skin: NEGATIVE for worrisome rashes, moles or lesions  GI/: NEGATIVE for bowel or bladder changes  Neuro: NEGATIVE for weakness, dizziness or paresthesias    OBJECTIVE:  /80  Ht 5' 5\" (1.651 m)  Wt 230 lb (104.3 kg)  BMI 38.27 kg/m2   General: healthy, alert and in no distress  HEENT: no scleral icterus or conjunctival erythema  Skin: no suspicious lesions or rash. No jaundice.  CV: no pedal edema  Resp: normal respiratory effort without conversational dyspnea   Psych: normal mood and affect  Gait: normal steady gait with appropriate coordination and balance  Neuro: Normal light sensory exam of lower extremity  MSK:  LEFT KNEE  Inspection:    normal alignment, pes planus, increased knee valgus with one leg squat  Palpation:    Tender about the medial patellar facet. Remainder of bony and ligamentous landmarks are nontender.    No effusion is present    Patellofemoral crepitus is Present  Range of Motion:     00 extension to 1350 flexion  Strength:    Quadriceps and gluteus weakness    Extensor mechanism intact  Special Tests:    Positive: Patellar grind    Negative: MCL/valgus stress (0 & 30 deg), LCL/varus stress (0 & 30 deg), Lachman's, posterior drawer, Jeffery's    Independent visualization of the below image:  Recent Results (from the past 24 hour(s))   XR Knee Standing AP Bilat [place] Bilat Lat Left    Narrative    XR KNEE STANDING AP BILAT [place] BILAT LAT LT, 4/19/2018 10:04 AM    Comparison: None    History: Left lower leg arthralgia.    Findings:   Right knee: Evaluation is limited to the anterior and patellar sunrise  projections. Joint spaces are preserved. No fractures are seen.    Left knee: Joint spaces are preserved. No fractures are seen. No knee  effusion identified.      " Impression    Impression: No acute bone abnormality or significant arthropathy  identified.     OLIVIA SANCHEZ MD     Patient's conditions were thoroughly discussed during today's visit with greater than 50% of the visit spent counseling the patient with total time spent face-to-face with the patient being 25 minutes.    Gamaliel Ryan DO [facility]      Again, thank you for allowing me to participate in the care of your patient.        Sincerely,        Gamaliel Ryan DO

## 2018-04-19 NOTE — PATIENT INSTRUCTIONS
1. Arthralgia of left lower leg    2. Weakness of left hip    3. Patellofemoral pain syndrome of left knee    4. Pes planus of left foot      Discussed xray - no significant arthritis  Discussed exam - pain due to weakness in thigh, hip and buttock. Knee is otherwise stable and no fluid in the joint  Activity modification as discussed - squats, lunges, stair, sit-to-stand and kneeling will bother you  Physical therapy: [facility] - [phone]  Can take up to 8 weeks to change your strength  Respect 6/10 pain - before, during, after    Follow up after 8 PT sessions  or sooner if needed. Call direct clinic number [[phone]] at any time with questions or concerns.    NEW WEBSITE HAS LAUNCHED  [email or web address]    We care about your feedback and use it to improve our services. Please leave feedback on the Testimonials & Reviews page.

## 2018-04-19 NOTE — PROGRESS NOTES
ASSESSMENT & PLAN    1. Arthralgia of left lower leg    2. Weakness of left hip    3. Patellofemoral pain syndrome of left knee    4. Pes planus of left foot      Discussed xray - no significant arthritis  Discussed exam - pain due to weakness in thigh, hip and buttock. Knee is otherwise stable and no fluid in the joint  Activity modification as discussed - squats, lunges, stair, sit-to-stand and kneeling will bother you  Physical therapy: [facility] - [phone]  Can take up to 8 weeks to change your strength  Respect 6/10 pain - before, during, after    Follow up after 8 PT sessions  or sooner if needed. Call direct clinic number [[phone]] at any time with questions or concerns.    -----    SUBJECTIVE  Obraulio MARLY Gary is a/an 37 year old female who is seen as a self referral for evaluation of left knee pain. The patient is seen by themselves.    Onset: 1-2 month(s) ago. Reports insidious onset without acute precipitating event.  Location of Pain: left medial knee with radiation to the lower leg, also feeling pain on the underside of the patella  Rating of Pain at worst: 7/10  Rating of Pain Currently: 2/10  Worsened by: stairs, crossing her legs, walking on uneven ground  Better with: rest  Treatments tried: bracing, topical ointments  Associated symptoms: possible posterior knee swelling  Orthopedic history:  pes planus, restless leg syndrome, seen at [facility] last year for similar knee issues and diagnosed with Bakers Cyst and referred for MRI but patient didn't complete  Relevant surgical history: NO  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    REVIEW OF SYSTEMS:  10 point ROS is negative other than symptoms noted above in HPI, Past Medical History or as stated below  Constitutional: NEGATIVE for fever, chills, change in weight  Skin: NEGATIVE for worrisome rashes,  "moles or lesions  GI/: NEGATIVE for bowel or bladder changes  Neuro: NEGATIVE for weakness, dizziness or paresthesias    OBJECTIVE:  /80  Ht 5' 5\" (1.651 m)  Wt 230 lb (104.3 kg)  BMI 38.27 kg/m2   General: healthy, alert and in no distress  HEENT: no scleral icterus or conjunctival erythema  Skin: no suspicious lesions or rash. No jaundice.  CV: no pedal edema  Resp: normal respiratory effort without conversational dyspnea   Psych: normal mood and affect  Gait: normal steady gait with appropriate coordination and balance  Neuro: Normal light sensory exam of lower extremity  MSK:  LEFT KNEE  Inspection:    normal alignment, pes planus, increased knee valgus with one leg squat  Palpation:    Tender about the medial patellar facet. Remainder of bony and ligamentous landmarks are nontender.    No effusion is present    Patellofemoral crepitus is Present  Range of Motion:     00 extension to 1350 flexion  Strength:    Quadriceps and gluteus weakness    Extensor mechanism intact  Special Tests:    Positive: Patellar grind    Negative: MCL/valgus stress (0 & 30 deg), LCL/varus stress (0 & 30 deg), Lachman's, posterior drawer, Jeffery's    Independent visualization of the below image:  Recent Results (from the past 24 hour(s))   XR Knee Standing AP Bilat [employer] Bilat Lat Left    Narrative    XR KNEE STANDING AP BILAT SUNRISE BILAT LAT LT, 4/19/2018 10:04 AM    Comparison: None    History: Left lower leg arthralgia.    Findings:   Right knee: Evaluation is limited to the anterior and patellar sunrise  projections. Joint spaces are preserved. No fractures are seen.    Left knee: Joint spaces are preserved. No fractures are seen. No knee  effusion identified.      Impression    Impression: No acute bone abnormality or significant arthropathy  identified.     OLIVIA SANCHEZ MD     Patient's conditions were thoroughly discussed during today's visit with greater than 50% of the visit spent counseling the patient with " total time spent face-to-face with the patient being 25 minutes.    Gamaliel Ryan DO [facility]

## 2018-04-24 ENCOUNTER — THERAPY VISIT (OUTPATIENT)
Dept: PHYSICAL THERAPY | Facility: CLINIC | Age: 38
End: 2018-04-24
Payer: COMMERCIAL

## 2018-04-24 DIAGNOSIS — M25.562 KNEE PAIN, LEFT: Primary | ICD-10-CM

## 2018-04-24 PROCEDURE — 97110 THERAPEUTIC EXERCISES: CPT | Mod: GP | Performed by: PHYSICAL THERAPIST

## 2018-04-24 PROCEDURE — 97161 PT EVAL LOW COMPLEX 20 MIN: CPT | Mod: GP | Performed by: PHYSICAL THERAPIST

## 2018-04-24 ASSESSMENT — ACTIVITIES OF DAILY LIVING (ADL)
AS_A_RESULT_OF_YOUR_KNEE_INJURY,_HOW_WOULD_YOU_RATE_YOUR_CURRENT_LEVEL_OF_DAILY_ACTIVITY?: SEVERELY ABNORMAL
SQUAT: ACTIVITY IS VERY DIFFICULT
LIMPING: I HAVE THE SYMPTOM BUT IT DOES NOT AFFECT MY ACTIVITY
SIT WITH YOUR KNEE BENT: ACTIVITY IS MINIMALLY DIFFICULT
STIFFNESS: THE SYMPTOM AFFECTS MY ACTIVITY SLIGHTLY
HOW_WOULD_YOU_RATE_THE_CURRENT_FUNCTION_OF_YOUR_KNEE_DURING_YOUR_USUAL_DAILY_ACTIVITIES_ON_A_SCALE_FROM_0_TO_100_WITH_100_BEING_YOUR_LEVEL_OF_KNEE_FUNCTION_PRIOR_TO_YOUR_INJURY_AND_0_BEING_THE_INABILITY_TO_PERFORM_ANY_OF_YOUR_USUAL_DAILY_ACTIVITIES?: 50
PAIN: THE SYMPTOM AFFECTS MY ACTIVITY MODERATELY
GO UP STAIRS: ACTIVITY IS FAIRLY DIFFICULT
KNEEL ON THE FRONT OF YOUR KNEE: ACTIVITY IS VERY DIFFICULT
HOW_WOULD_YOU_RATE_THE_OVERALL_FUNCTION_OF_YOUR_KNEE_DURING_YOUR_USUAL_DAILY_ACTIVITIES?: ABNORMAL
RISE FROM A CHAIR: ACTIVITY IS MINIMALLY DIFFICULT
SWELLING: I DO NOT HAVE THE SYMPTOM
GIVING WAY, BUCKLING OR SHIFTING OF KNEE: I HAVE THE SYMPTOM BUT IT DOES NOT AFFECT MY ACTIVITY
WEAKNESS: THE SYMPTOM AFFECTS MY ACTIVITY MODERATELY
GO DOWN STAIRS: ACTIVITY IS MINIMALLY DIFFICULT
RAW_SCORE: 44
STAND: ACTIVITY IS SOMEWHAT DIFFICULT
KNEE_ACTIVITY_OF_DAILY_LIVING_SUM: 44
WALK: ACTIVITY IS NOT DIFFICULT
KNEE_ACTIVITY_OF_DAILY_LIVING_SCORE: 62.86
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
## 2018-04-24 NOTE — MR AVS SNAPSHOT
After Visit Summary   4/24/2018    Holland Gary    MRN: [id number]           Patient Information     Date Of Birth          1980        Visit Information        Provider Department      4/24/2018 11:20 AM Silvano Adame PT [place] [facility] [place]        Today's Diagnoses     Knee pain, left    -  1       Follow-ups after your visit        Your next 10 appointments already scheduled     Apr 30, 2018 11:30 AM CDT   MAU Extremity with Silvano Adame PT   [place] [facility] [place] ([facility] [place]  )    [address] [place] [address]  [address]  [facility] [address]   [phone]            May 08, 2018 10:40 AM CDT   MAU Extremity with Silvano Adame PT   [place] [facility] [place] ([facility] [place]  )    [address] [place] [address]  [address]  [place] [address]   [phone]              Who to contact     If you have questions or need follow up information about today's clinic visit or your schedule please contact [place] [facility] [place] directly at [phone].  Normal or non-critical lab and imaging results will be communicated to you by Editoriallyhart, letter or phone within 4 business days after the clinic has received the results. If you do not hear from us within 7 days, please contact the clinic through LightUpt or phone. If you have a critical or abnormal lab result, we will notify you by phone as soon as possible.  Submit refill requests through ClearApp or call your pharmacy and they will forward the refill request to us. Please allow 3 business days for your refill to be completed.          Additional Information About Your Visit        Editoriallyhart Information     ClearApp gives you secure access to your electronic health record. If you see a primary care provider, you can also send messages to your care team and make appointments. If you have questions, please call your primary care clinic.  If you do not have a primary care provider, please call [phone] and  they will assist you.        Care EveryWhere ID     This is your Care EveryWhere ID. This could be used by other organizations to access your [facility] medical records  [id number]         Blood Pressure from Last 3 Encounters:   04/19/18 132/80   12/27/17 116/72   07/10/17 100/72    Weight from Last 3 Encounters:   04/19/18 104.3 kg (230 lb)   12/27/17 106.5 kg (234 lb 11.2 oz)   07/10/17 101.7 kg (224 lb 1.6 oz)              We Performed the Following     HC PT EVAL, LOW COMPLEXITY     MAU INITIAL EVAL REPORT     THERAPEUTIC EXERCISES        Primary Care Provider Office Phone # Fax #    Mindy Martin [phone]       [place] [address]        Equal Access to Services     MUKESH SRINIVASAN : Hadii aad rocio hadasho Sobrenda, waaxda luqadaha, qaybta kaalmada adeegyada, letha cannon . So [facility] [phone].    ATENCIÓN: Si habla español, tiene a miller disposición servicios gratuitos de asistencia lingüística. Piyush al [phone].    We comply with applicable federal civil rights laws and [place] laws. We do not discriminate on the basis of race, color, national origin, age, disability, sex, sexual orientation, or gender identity.            Thank you!     Thank you for choosing [facility]  for your care. Our goal is always to provide you with excellent care. Hearing back from our patients is one way we can continue to improve our services. Please take a few minutes to complete the written survey that you may receive in the mail after your visit with us. Thank you!             Your Updated Medication List - Protect others around you: Learn how to safely use, store and throw away your medicines at www.disposemymeds.org.          This list is accurate as of 4/24/18 12:00 PM.  Always use your most recent med list.                   Brand Name Dispense Instructions for use Diagnosis    * ADDERALL PO      Take 10 mg  by mouth daily        * ADDERALL XR PO      Take 30 mg by mouth daily        carBAMazepine 200 MG tablet    TEGretol     Take 200 mg by mouth        cyclobenzaprine 10 MG tablet    FLEXERIL     Take 10 mg by mouth        escitalopram 10 MG tablet    LEXAPRO     Take 10 mg by mouth        TYLENOL 325 MG tablet   Generic drug:  acetaminophen      Take 325-650 mg by mouth every 6 hours as needed        VENTOLIN HFA IN      Reported on 5/13/2017        vitamin D3 1000 units Caps      Take 2,000 Units by mouth        * Notice:  This list has 2 medication(s) that are the same as other medications prescribed for you. Read the directions carefully, and ask your doctor or other care provider to review them with you.

## 2018-04-30 ENCOUNTER — THERAPY VISIT (OUTPATIENT)
Dept: PHYSICAL THERAPY | Facility: CLINIC | Age: 38
End: 2018-04-30
Payer: COMMERCIAL

## 2018-04-30 DIAGNOSIS — M25.562 KNEE PAIN, LEFT: ICD-10-CM

## 2018-04-30 PROCEDURE — 97110 THERAPEUTIC EXERCISES: CPT | Mod: GP | Performed by: PHYSICAL THERAPIST

## 2018-04-30 PROCEDURE — 97530 THERAPEUTIC ACTIVITIES: CPT | Mod: GP | Performed by: PHYSICAL THERAPIST

## 2018-05-08 ENCOUNTER — THERAPY VISIT (OUTPATIENT)
Dept: PHYSICAL THERAPY | Facility: CLINIC | Age: 38
End: 2018-05-08
Payer: COMMERCIAL

## 2018-05-08 DIAGNOSIS — M25.562 KNEE PAIN, LEFT: ICD-10-CM

## 2018-05-08 PROCEDURE — 97530 THERAPEUTIC ACTIVITIES: CPT | Mod: GP | Performed by: PHYSICAL THERAPIST

## 2018-05-08 PROCEDURE — 97110 THERAPEUTIC EXERCISES: CPT | Mod: GP | Performed by: PHYSICAL THERAPIST

## 2018-05-17 ENCOUNTER — TELEPHONE (OUTPATIENT)
Dept: ORTHOPEDICS | Facility: CLINIC | Age: 38
End: 2018-05-17

## 2018-05-17 NOTE — TELEPHONE ENCOUNTER
Called patient with questions regarding this form.  Patient states she is only requesting time off to attend physical therapy appointments as nights and weekends are not available at the [facility].  Discussed that other clinics may have extended hours if she is interested in looking into that.  Will fill out the form relating to time off to attend therapy sessions.    Form started and left with Dr. Ryan for signature.    Dean Vargas ATC

## 2018-05-17 NOTE — TELEPHONE ENCOUNTER
Received form from patient requesting a leave of absence due to serious health condition.    Patient was last seen April 19, 2018 for left knee pain.  Was diagnosed with patellofemoral syndrome and weakness in the hip/glutes.  Was referred to physical therapy and has completed 3 sessions of therapy.  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  No restrictions were given at the time of the office visit.    Please advise if willing to complete this form.  Form left in Dr. Ryan's in box.    Dean Vargas ATC

## 2018-05-18 NOTE — TELEPHONE ENCOUNTER
Form signed and returned to office staff.    Gamaliel Ryan DO, EUNICEM  [facility]

## 2018-05-22 NOTE — TELEPHONE ENCOUNTER
Completed form faxed to [facility]    [phone]    Form submitted for scanning    Dean Vargas ATC

## 2018-06-07 ENCOUNTER — THERAPY VISIT (OUTPATIENT)
Dept: PHYSICAL THERAPY | Facility: CLINIC | Age: 38
End: 2018-06-07
Payer: COMMERCIAL

## 2018-06-07 DIAGNOSIS — M25.562 KNEE PAIN, LEFT: ICD-10-CM

## 2018-06-07 PROCEDURE — 97110 THERAPEUTIC EXERCISES: CPT | Mod: GP | Performed by: PHYSICAL THERAPIST

## 2018-06-07 PROCEDURE — 97530 THERAPEUTIC ACTIVITIES: CPT | Mod: GP | Performed by: PHYSICAL THERAPIST

## 2018-06-14 ENCOUNTER — THERAPY VISIT (OUTPATIENT)
Dept: PHYSICAL THERAPY | Facility: CLINIC | Age: 38
End: 2018-06-14
Payer: COMMERCIAL

## 2018-06-14 DIAGNOSIS — M25.562 KNEE PAIN, LEFT: ICD-10-CM

## 2018-06-14 PROCEDURE — 97110 THERAPEUTIC EXERCISES: CPT | Mod: GP | Performed by: PHYSICAL THERAPIST

## 2018-06-14 NOTE — MR AVS SNAPSHOT
After Visit Summary   6/14/2018    Holland Gary    MRN: [id number]           Patient Information     Date Of Birth          1980        Visit Information        Provider Department      6/14/2018 2:40 PM Silvano Adame PT [place] [facility] [place]        Today's Diagnoses     Knee pain, left           Follow-ups after your visit        Your next 10 appointments already scheduled     Jul 19, 2018 10:40 AM CDT   PHYSICAL with Kelsy Espinoza MD   [facility] [place] ([facility])    [address] [place] [address]  [address]  [facility] [address] [phone]              Who to contact     If you have questions or need follow up information about today's clinic visit or your schedule please contact [place] [facility] [place] directly at [phone].  Normal or non-critical lab and imaging results will be communicated to you by MyChart, letter or phone within 4 business days after the clinic has received the results. If you do not hear from us within 7 days, please contact the clinic through MyChart or phone. If you have a critical or abnormal lab result, we will notify you by phone as soon as possible.  Submit refill requests through WhatsNew Asia or call your pharmacy and they will forward the refill request to us. Please allow 3 business days for your refill to be completed.          Additional Information About Your Visit        MyChart Information     WhatsNew Asia gives you secure access to your electronic health record. If you see a primary care provider, you can also send messages to your care team and make appointments. If you have questions, please call your primary care clinic.  If you do not have a primary care provider, please call [phone] and they will assist you.        Care EveryWhere ID     This is your Care EveryWhere ID. This could be used by other organizations to access your [place] medical records  [id number]         Blood Pressure  from Last 3 Encounters:   04/19/18 132/80   12/27/17 116/72   07/10/17 100/72    Weight from Last 3 Encounters:   04/19/18 104.3 kg (230 lb)   12/27/17 106.5 kg (234 lb 11.2 oz)   07/10/17 101.7 kg (224 lb 1.6 oz)              We Performed the Following     THERAPEUTIC EXERCISES        Primary Care Provider Office Phone # Fax #    Mindy Martin [phone]       [address] [address]        Equal Access to Services     [facility] : Hadii aad ku hadasho Soomaali, waaxda luqadaha, qaybta kaalmada adeginetteyaceasar, letha cannon . So [facility] [phone].    ATENCIÓN: Si habla español, tiene a miller disposición servicios gratuitos de asistencia lingüística. [facility] [phone].    We comply with applicable federal civil rights laws and [place] laws. We do not discriminate on the basis of race, color, national origin, age, disability, sex, sexual orientation, or gender identity.            Thank you!     Thank you for choosing [facility]  for your care. Our goal is always to provide you with excellent care. Hearing back from our patients is one way we can continue to improve our services. Please take a few minutes to complete the written survey that you may receive in the mail after your visit with us. Thank you!             Your Updated Medication List - Protect others around you: Learn how to safely use, store and throw away your medicines at www.disposemymeds.org.          This list is accurate as of 6/14/18  3:14 PM.  Always use your most recent med list.                   Brand Name Dispense Instructions for use Diagnosis    * ADDERALL PO      Take 10 mg by mouth daily        * ADDERALL XR PO      Take 30 mg by mouth daily        carBAMazepine 200 MG tablet    TEGretol     Take 200 mg by mouth        cyclobenzaprine 10 MG tablet    FLEXERIL     Take 10 mg by mouth        escitalopram 10 MG tablet    LEXAPRO      Take 10 mg by mouth        TYLENOL 325 MG tablet   Generic drug:  acetaminophen      Take 325-650 mg by mouth every 6 hours as needed        VENTOLIN HFA IN      Reported on 5/13/2017        vitamin D3 1000 units Caps      Take 2,000 Units by mouth        * Notice:  This list has 2 medication(s) that are the same as other medications prescribed for you. Read the directions carefully, and ask your doctor or other care provider to review them with you.

## 2018-06-17 ENCOUNTER — RADIANT APPOINTMENT (OUTPATIENT)
Dept: GENERAL RADIOLOGY | Facility: CLINIC | Age: 38
End: 2018-06-17
Attending: PHYSICIAN ASSISTANT
Payer: COMMERCIAL

## 2018-06-17 ENCOUNTER — OFFICE VISIT (OUTPATIENT)
Dept: URGENT CARE | Facility: URGENT CARE | Age: 38
End: 2018-06-17
Payer: COMMERCIAL

## 2018-06-17 VITALS
SYSTOLIC BLOOD PRESSURE: 100 MMHG | RESPIRATION RATE: 16 BRPM | DIASTOLIC BLOOD PRESSURE: 68 MMHG | TEMPERATURE: 98.7 F | HEART RATE: 88 BPM | WEIGHT: 231 LBS | BODY MASS INDEX: 38.44 KG/M2

## 2018-06-17 DIAGNOSIS — M79.5 FOREIGN BODY (FB) IN SOFT TISSUE: ICD-10-CM

## 2018-06-17 DIAGNOSIS — M79.5 FOREIGN BODY (FB) IN SOFT TISSUE: Primary | ICD-10-CM

## 2018-06-17 PROCEDURE — 99213 OFFICE O/P EST LOW 20 MIN: CPT | Performed by: PHYSICIAN ASSISTANT

## 2018-06-17 PROCEDURE — 73630 X-RAY EXAM OF FOOT: CPT | Mod: RT

## 2018-06-17 ASSESSMENT — ENCOUNTER SYMPTOMS
NAUSEA: 1
CONSTITUTIONAL NEGATIVE: 1
CHILLS: 0
FEVER: 0
RESPIRATORY NEGATIVE: 1
ABDOMINAL PAIN: 0
CARDIOVASCULAR NEGATIVE: 1

## 2018-06-17 NOTE — MR AVS SNAPSHOT
After Visit Summary   6/17/2018    Holland Gary    MRN: [id number]           Patient Information     Date Of Birth          1980        Visit Information        Provider Department      6/17/2018 4:10 PM Zoila Ventura PA-C [facility]        Today's Diagnoses     Foreign body (FB) in soft tissue    -  1      Care Instructions    Monitor for worrisome signs such as redness, warmth, pus drainage, or swelling in the area. Keep the area clean and dry, gently soaking the area in plain warm water twice daily for the next 3-5 days.   Foreign Object Under the Skin (Removed)  An object has been removed from under your skin. Although care was taken to remove all of it, there is always a chance that a small piece may have been left behind.  Most skin wounds heal without problems. But there can be an increased risk of infection if anything stays under the skin. Sometimes the pieces work their way out on their own, and sometimes they can cause an infection. Very small pieces that stay under the skin usually don t cause a problem or need further treatment.  Home care  Wound care    Keep the wound clean and dry.    If there is a dressing or bandage, change it when it gets wet or dirty. Otherwise, leave it on for the first 24 hours, then change it once a day or as often as you were instructed.    If stitches or staples were used, clean the wound every day:  ? After taking off the dressing, wash the area gently with soap and water.  ? Apply a thin layer of antibiotic ointment to the cut. This will keep the wound clean and make it easier to remove the stitches. If it is oozing a lot, you can put a nonstick dressing over it. Then reapply the bandage or dressing as you were instructed.  ? You can get it wet, just like when you clean it. This means you can shower as usual for the first 24 hours, but do not soak the area in water (no baths or swimming) until the stitches or staples are  taken out.    If surgical tape or strips were used, keep the area clean and dry. If it becomes wet, blot it dry with a towel.    Medicine    You can take over-the-counter medicine for pain, unless you were given a different pain medicine to use. If you have chronic liver or kidney disease or ever had a stomach ulcer, or gastrointestinal bleeding or are taking blood thinner medicines, talk with your healthcare provider before using these medicines.    If you were given antibiotics, take them until they are used up. It is important to finish the antibiotics even if the wound looks better to make sure the infection clears.  Follow-up care  Follow up your healthcare provider, or as advised. Keep in mind the following:    Watch for any signs of infection, such as increasing pain, redness, swelling, or pus drainage. If this happens, don t wait for your scheduled visit, rather see your healthcare provider sooner.    Stitches or staples are usually taken out within 5 to 14 days. This varies depending on what part of your body they are on, and the type of wound. The healthcare provider will tell you how long they should be left in.    If surgical tape or strips were used, they are usually left on for 7 to 10 days. You can remove them after that unless you were told otherwise. If you try to remove them, and it is too difficult, soaking can help. If the edges of the cut pull apart, then stop removing the tape, and follow up with your healthcare provider.    If X-rays were taken, you will be told if there are new findings that may affect your care.  When to seek medical care  Call your healthcare provider right away if any of these occur:    Fever of 100.4 F (38 C) or higher, or as directed by your healthcare provider    Increasing pain in the wound    Redness, swelling or pus coming from the wound  Date Last Reviewed: 10/1/2016    4461-9735 [employer]. [address] [place] [address], [place] [address]. All rights  reserved. This information is not intended as a substitute for professional medical care. Always follow your healthcare professional's instructions.                Follow-ups after your visit        Your next 10 appointments already scheduled     Jul 19, 2018 10:40 AM CDT   PHYSICAL with Kelsy Espinoza MD   [place]    [address] [place] [address]  [place] [address] [phone]              Who to contact     If you have questions or need follow up information about today's clinic visit or your schedule please contact [facility] directly at [phone].  Normal or non-critical lab and imaging results will be communicated to you by MyChart, letter or phone within 4 business days after the clinic has received the results. If you do not hear from us within 7 days, please contact the clinic through XillianTVhart or phone. If you have a critical or abnormal lab result, we will notify you by phone as soon as possible.  Submit refill requests through Fastacash or call your pharmacy and they will forward the refill request to us. Please allow 3 business days for your refill to be completed.          Additional Information About Your Visit        MyChart Information     Fastacash gives you secure access to your electronic health record. If you see a primary care provider, you can also send messages to your care team and make appointments. If you have questions, please call your primary care clinic.  If you do not have a primary care provider, please call [phone] and they will assist you.        Care EveryWhere ID     This is your Care EveryWhere ID. This could be used by other organizations to access your [place] medical records  [id number]        Your Vitals Were     Pulse Temperature Respirations BMI (Body Mass Index)          88 98.7  F (37.1  C) (Oral) 16 38.44 kg/m2         Blood Pressure from Last 3 Encounters:   06/17/18 100/68   04/19/18  132/80   12/27/17 116/72    Weight from Last 3 Encounters:   06/17/18 231 lb (104.8 kg)   04/19/18 230 lb (104.3 kg)   12/27/17 234 lb 11.2 oz (106.5 kg)               Primary Care Provider Office Phone # Fax #    Mindy Martin [phone]       [address] [address]        Equal Access to Services     MUKESH SRINIVASAN : Hadii aad ku hadasho Soomaali, waaxda luqadaha, qaybta kaalmada adeegyada, waxay idiin hayaan adeeg malihaelsyabelardo layuni . So [facility] [phone].    ATENCIÓN: Si habla español, tiene a miller disposición servicios gratuitos de asistencia lingüística. [facility] [phone].    We comply with applicable federal civil rights laws and [place] laws. We do not discriminate on the basis of race, color, national origin, age, disability, sex, sexual orientation, or gender identity.            Thank you!     Thank you for choosing [facility]  for your care. Our goal is always to provide you with excellent care. Hearing back from our patients is one way we can continue to improve our services. Please take a few minutes to complete the written survey that you may receive in the mail after your visit with us. Thank you!             Your Updated Medication List - Protect others around you: Learn how to safely use, store and throw away your medicines at www.disposemymeds.org.          This list is accurate as of 6/17/18  4:46 PM.  Always use your most recent med list.                   Brand Name Dispense Instructions for use Diagnosis    * ADDERALL PO      Take 10 mg by mouth daily        * ADDERALL XR PO      Take 30 mg by mouth daily        carBAMazepine 200 MG tablet    TEGretol     Take 200 mg by mouth        cyclobenzaprine 10 MG tablet    FLEXERIL     Take 10 mg by mouth        escitalopram 10 MG tablet    LEXAPRO     Take 10 mg by mouth        TYLENOL 325 MG tablet   Generic drug:  acetaminophen      Take 325-650 mg by mouth every 6 hours as  needed        VENTOLIN HFA IN      Reported on 5/13/2017        vitamin D3 1000 units Caps      Take 2,000 Units by mouth        * Notice:  This list has 2 medication(s) that are the same as other medications prescribed for you. Read the directions carefully, and ask your doctor or other care provider to review them with you.

## 2018-06-17 NOTE — PROGRESS NOTES
SUBJECTIVE:   Holland Gary is a 37 year old female presenting with a chief complaint of   Chief Complaint   Patient presents with     Urgent Care     Pt got metal stuck bottom right foot       She is an established patient of [place].    Laceration    Mechanism of injury:   Pt reports she was walking around her garage barefoot when she stepped on a thin wire metal object (near the size of a paperclip) that penetrated into her right heel. She denies bleeding, tingling, change in function of foot. She has no ankle pain, tenderness, or swelling. She is feeling a little nauseous after attempting to remove the object and was unable to do so. She has mild discomfort with movement of her foot and overall feels like pressure or increased pain if metal moves.   Pt reports she received tetanus last year (per her records this was 4/27/17).   History provided by: Patient  Time of injury was 20minutes prior to arrival   This is a non-work related injury.    Associated symptoms: Denies numbness, weakness, or loss of function    Last tetanus booster within 10 years: Yes      Review of Systems   Constitutional: Negative.  Negative for chills and fever.   Respiratory: Negative.    Cardiovascular: Negative.    Gastrointestinal: Positive for nausea. Negative for abdominal pain.       Past Medical History:   Diagnosis Date     Trigeminal neuralgia      Family History   Problem Relation Age of Onset     Schizophrenia Mother      Depression Mother      Schizophrenia Daughter      Asthma Daughter      DIABETES Maternal Uncle      Depression Brother      Breast Cancer Maternal Grandmother      x2     Chronic Obstructive Pulmonary Disease Maternal Grandfather      Depression Maternal Grandfather      Dementia Paternal Grandmother      Dementia Paternal Uncle      Breast Cancer Maternal Aunt      Current Outpatient Prescriptions   Medication Sig Dispense Refill     Amphetamine-Dextroamphetamine (ADDERALL PO) Take 10 mg by mouth daily        carBAMazepine (TEGRETOL) 200 MG tablet Take 200 mg by mouth       Cholecalciferol (VITAMIN D3) 1000 UNITS CAPS Take 2,000 Units by mouth       escitalopram (LEXAPRO) 10 MG tablet Take 10 mg by mouth       acetaminophen (TYLENOL) 325 MG tablet Take 325-650 mg by mouth every 6 hours as needed       Albuterol Sulfate (VENTOLIN HFA IN) Reported on 5/13/2017       Amphetamine-Dextroamphetamine (ADDERALL XR PO) Take 30 mg by mouth daily       cyclobenzaprine (FLEXERIL) 10 MG tablet Take 10 mg by mouth       Social History   Substance Use Topics     Smoking status: Light Tobacco Smoker     Packs/day: 0.50     Types: Cigarettes     Smokeless tobacco: Never Used     Alcohol use 0.0 oz/week     0 Standard drinks or equivalent per week      Comment: on occ       OBJECTIVE  /68  Pulse 88  Temp 98.7  F (37.1  C) (Oral)  Resp 16  Wt 231 lb (104.8 kg)  BMI 38.44 kg/m2    Physical Exam   Constitutional: She appears well-developed and well-nourished. No distress.   HENT:   Head: Normocephalic.   Pulmonary/Chest: Effort normal.   Musculoskeletal: Normal range of motion. She exhibits no deformity.   Skin: Skin is warm and dry.   Thin metal wire penetrating skin about 1mm in depth. Removed without difficulty. No surrounding erythema. No significant bleeding.   Area cleansed with hibiclens.        Labs:  No results found for this or any previous visit (from the past 24 hour(s)).    X-Ray was done; my findings are: negative for foreign body  Radiologic impression: Three views right foot. No fracture or dislocation. No  significant soft tissue swelling. No evidence of radiopaque foreign  body.    ASSESSMENT:      ICD-10-CM    1. Foreign body (FB) in soft tissue M79.5 XR Foot Right G/E 3 Views        Medical Decision Making:    Discussed with patient ease of removal of foreign body and no palpable remaining metal. Pt agrees with proceeding with XR to ensure no further remnant remains. Area cleansed with Hibiclens prior to  discharge     Differential Diagnosis:  MS Injury Pain: contusion and foreign body    Serious Comorbid Conditions:  Adult:  HLD    PLAN:    Foreign body removed:  Ice, Ibuprofen, elevate and Tetanus up to date. Pt educated to monitor for worrisome signs such as redness, warmth, pus drainage, or swelling in the area. Keep the area clean and dry, gently soaking the area in plain warm water twice daily for the next 3-5 days.    Followup:    If not improving or if condition worsens, follow up with your Primary Care Provider    Patient Instructions   Monitor for worrisome signs such as redness, warmth, pus drainage, or swelling in the area. Keep the area clean and dry, gently soaking the area in plain warm water twice daily for the next 3-5 days.   Foreign Object Under the Skin (Removed)  An object has been removed from under your skin. Although care was taken to remove all of it, there is always a chance that a small piece may have been left behind.  Most skin wounds heal without problems. But there can be an increased risk of infection if anything stays under the skin. Sometimes the pieces work their way out on their own, and sometimes they can cause an infection. Very small pieces that stay under the skin usually don t cause a problem or need further treatment.  Home care  Wound care    Keep the wound clean and dry.    If there is a dressing or bandage, change it when it gets wet or dirty. Otherwise, leave it on for the first 24 hours, then change it once a day or as often as you were instructed.    If stitches or staples were used, clean the wound every day:  ? After taking off the dressing, wash the area gently with soap and water.  ? Apply a thin layer of antibiotic ointment to the cut. This will keep the wound clean and make it easier to remove the stitches. If it is oozing a lot, you can put a nonstick dressing over it. Then reapply the bandage or dressing as you were instructed.  ? You can get it wet, just like  when you clean it. This means you can shower as usual for the first 24 hours, but do not soak the area in water (no baths or swimming) until the stitches or staples are taken out.    If surgical tape or strips were used, keep the area clean and dry. If it becomes wet, blot it dry with a towel.    Medicine    You can take over-the-counter medicine for pain, unless you were given a different pain medicine to use. If you have chronic liver or kidney disease or ever had a stomach ulcer, or gastrointestinal bleeding or are taking blood thinner medicines, talk with your healthcare provider before using these medicines.    If you were given antibiotics, take them until they are used up. It is important to finish the antibiotics even if the wound looks better to make sure the infection clears.  Follow-up care  Follow up your healthcare provider, or as advised. Keep in mind the following:    Watch for any signs of infection, such as increasing pain, redness, swelling, or pus drainage. If this happens, don t wait for your scheduled visit, rather see your healthcare provider sooner.    Stitches or staples are usually taken out within 5 to 14 days. This varies depending on what part of your body they are on, and the type of wound. The healthcare provider will tell you how long they should be left in.    If surgical tape or strips were used, they are usually left on for 7 to 10 days. You can remove them after that unless you were told otherwise. If you try to remove them, and it is too difficult, soaking can help. If the edges of the cut pull apart, then stop removing the tape, and follow up with your healthcare provider.    If X-rays were taken, you will be told if there are new findings that may affect your care.  When to seek medical care  Call your healthcare provider right away if any of these occur:    Fever of 100.4 F (38 C) or higher, or as directed by your healthcare provider    Increasing pain in the wound    Redness,  swelling or pus coming from the wound  Date Last Reviewed: 10/1/2016    9833-2849 [employer]. [address] [place] [address], [place] [address]. All rights reserved. This information is not intended as a substitute for professional medical care. Always follow your healthcare professional's instructions.

## 2018-07-30 ENCOUNTER — OFFICE VISIT (OUTPATIENT)
Dept: PEDIATRICS | Facility: CLINIC | Age: 38
End: 2018-07-30
Payer: COMMERCIAL

## 2018-07-30 VITALS
HEIGHT: 65 IN | TEMPERATURE: 97.9 F | WEIGHT: 237.2 LBS | DIASTOLIC BLOOD PRESSURE: 82 MMHG | OXYGEN SATURATION: 96 % | HEART RATE: 79 BPM | SYSTOLIC BLOOD PRESSURE: 122 MMHG | BODY MASS INDEX: 39.52 KG/M2

## 2018-07-30 DIAGNOSIS — R21 RASH: Primary | ICD-10-CM

## 2018-07-30 DIAGNOSIS — L01.00 IMPETIGO: ICD-10-CM

## 2018-07-30 PROCEDURE — 99214 OFFICE O/P EST MOD 30 MIN: CPT | Performed by: PHYSICIAN ASSISTANT

## 2018-07-30 RX ORDER — PERMETHRIN 50 MG/G
CREAM TOPICAL ONCE
Qty: 60 G | Refills: 1 | Status: SHIPPED | OUTPATIENT
Start: 2018-07-30 | End: 2018-07-30

## 2018-07-30 RX ORDER — MUPIROCIN 20 MG/G
OINTMENT TOPICAL 3 TIMES DAILY
Qty: 22 G | Refills: 0 | Status: SHIPPED | OUTPATIENT
Start: 2018-07-30 | End: 2018-08-04

## 2018-07-30 NOTE — LETTER
July 30, 2018      Holland Gary  [address] [place] [address]        To Whom It May Concern:    Holland Gary  was seen on 7/30/18.  Please excuse her today due to illness.        Sincerely,        Teena Fields PA-C

## 2018-07-30 NOTE — MR AVS SNAPSHOT
After Visit Summary   7/30/2018    Holland Gary    MRN: [id number]           Patient Information     Date Of Birth          1980        Visit Information        Provider Department      7/30/2018 9:50 AM Teena Fields PA-C [facility]        Today's Diagnoses     Rash    -  1    Impetigo          Care Instructions               Impetigo  What is impetigo?   Impetigo is a skin infection caused by bacteria. It is more common in children than in adults. Impetigo is usually a mild infection but it can spread and cause serious illness if it is not treated.   How does it occur?   Impetigo is caused by bacteria. The 2 types of bacteria that cause the infection are called Staphylococcus aureus and Streptococcus pyogenes (Group A streptococcus). These bacteria can live on your skin without hurting you. However, if they get into a wound, they may cause an infection.   Impetigo is more likely to happen if you have a chronic skin condition like eczema, or when you have a scratch, scrape, insect bite, or other skin irritation that causes a break in the skin. Impetigo is more common when it is hot and humid. It is very contagious. Physical contact, including scratching, can spread the infection to other parts of the body or to other people. It can also be spread by contaminated clothing, athletic equipment, towels, bed linen, and toys.   What are the symptoms?   Impetigo can occur on any area of skin. It often appears on the face between the upper lip and nose. The infection begins as small blisters. The blisters form pus inside and then break open. The pus from the blisters dries as a gold or yellow-colored crust. The blisters or sores are painless.   How is it diagnosed?   Your healthcare provider will look at the blisters or sores on your skin. Impetigo can often be diagnosed without any tests. In some cases your provider may remove a small bit of material from one of the sores  for lab tests to identify the bacteria.   How is it treated?   The treatment depends on your age and the severity and type of infection that you have. If the infection is mild, all you may need to do is keep your skin clean so the infection can heal on its own. Your healthcare provider may prescribe an antibiotic ointment to put on your skin.   You may need to put some of the antibiotic ointment inside your nose. Some people carry the bacteria inside their nose and the infection may come back if the nose is not treated.   For larger or more serious infections, your provider may prescribe an oral antibiotic medicine or give you a shot of antibiotic medicine.   How long will the effects last?   The sores should begin to heal within 2 to 5 days after you start using an antibiotic. If you are taking an oral antibiotic, the infection usually stops being contagious after 24 hours of treatment. If you are using an antibiotic ointment instead, the sores will no longer be contagious when they stop oozing and are drying up.   How can I help take care of myself?   Follow these tips to ease the discomfort of impetigo:   Gently wash the infected area with antibacterial soap. Soak the area for 15 to 20 minutes in warm soapy water. Then gently remove the crusts.   Cover the sores with a gauze bandage to keep the infection from spreading and to prevent scratching.   Shave around sores, not over them.   Avoid touching the sores more than necessary.   If your provider prescribed an antibiotic ointment, gently pat your skin dry after you wash the infected area and put a thin layer of antibiotic ointment on it with a cotton swab (Q-Tip). Do not touch the tube of antibiotic ointment to the infected area. Also do not touch the ointment tube with the used cotton swab. If you need more ointment, use a new cotton swab. Do not use the ointment more often than directed. Wash your hands thoroughly after using this medicine.   If your provider  prescribed an antibiotic to take by mouth, take all of it exactly as directed by your provider. If you stop taking the medicine too soon, the infection may not be completely gone yet or it may return.   Call your healthcare provider if:   You develop a fever.   Your skin does not begin to heal after 3 days of treatment.   How can I help prevent impetigo?   Wash cuts and scratches, insect bites, or other breaks in the skin with warm water and soap right away to prevent infection. Cover the area with an adhesive bandage (Band-Aid).   Wash your hands often with warm water and soap for at least 15 seconds.   Do not share washcloths, towels, clothing, bath water, or personal items like razors or sellers.   Use hot, soapy water to wash clothes and linens. Dry clothes on the hot setting if you use an automatic dryer.   Shower or bathe using soap every day and after strenuous exercise.     Published by [employer].  This content is reviewed periodically and is subject to change as new health information becomes available. The information is intended to inform and educate and is not a replacement for medical evaluation, advice, diagnosis or treatment by a healthcare professional.   Developed by [employer].   ? 2010 [facility] and/or its affiliates. All Rights Reserved.   Copyright   Clinical Reference Systems 2011  Adult Health Advisor                       Scabies   What is scabies?   Scabies is a very contagious but treatable skin disease. A very small bug (mite) causes it. The mites burrow into the skin, causing a very itchy rash.   How does it occur?   Scabies mites live in human skin. They spread from person to person through direct contact or from clothing and bedding.   What are the symptoms?   The main symptom of scabies is a very itchy rash. It appears as tiny blisters or bumps, which break easily when scratched. The blisters are usually in a thin line.   Although the rash can start anywhere, it often starts on the  hands, between the fingers or in a crease of the wrist. Other common areas for the mites are the nipples, waistline, and male genital area. After the rash begins, it can spread within a few days to the whole body.   How is it diagnosed?   Your healthcare provider will ask about your symptoms and whether you have been exposed to someone who has scabies. Your provider will examine your rash. He or she may get a scraping from your skin to look for mites in the skin.   How is it treated?   Your healthcare provider will prescribe a skin cream that has an insecticide in it. Usually the instructions for use of the cream are as follows:   After a bath or shower, put the cream on your whole body, from the top of your head to the soles of your feet. Put the cream under your fingernails and toenails with a toothpick. Make sure you don't get the cream in your eyes.   Leave the cream on your body for 8 to 14 hours and then wash it off.   If you wash your hands or another part of your body during the 8 to 14 hours after you first put the cream on, put more cream on the area you washed.   It's easiest to put the cream on before bed and then wash it off in the morning.   The instructions for use of medicines for scabies vary somewhat, so be sure to check and follow the instructions that come with your medicine.   Your healthcare provider may prescribe an oral antihistamine medicine, such as Benadryl, Claritin, or Zyrtec, to help relieve the itching. You can also soothe itching by putting 1% hydrocortisone cream on your skin.   How long will the effects last?   You will have itching and a rash for 2 to 4 weeks after your treatment with the cream prescribed by your healthcare provider. Continuing to have the rash does not mean that the treatment didn't work or that it needs to be repeated. The symptoms will not go away until your body sheds the layers of skin that contain the bodies of the mites, their eggs, and their droppings. Keep  taking antihistamines as long as you have itching.   You may need a second treatment if:   You have symptoms 4 weeks or more after your treatment with the cream.   Your symptoms get much worse after your first treatment.   What can I do to help prevent the spread of scabies?   To prevent reinfection or spread of scabies, everyone living in your home may need to be treated at the same time.   When you start treatment, wash all the clothing, bedding, and towels that you've used in the past 2 days in hot water. Put items that can't be washed into plastic bags. You need to keep them in the bags for 3 days to kill the mites.   Written by Galo Layne MD.   Published by [employer].   Last modified: 2007-05-07   Last reviewed: 2008-06-01   This content is reviewed periodically and is subject to change as new health information becomes available. The information is intended to inform and educate and is not a replacement for medical evaluation, advice, diagnosis or treatment by a healthcare professional.   Adult Health Advisor 2009.1 Index  Adult Health Advisor 2009.1 Credits     2009 [facility] and/or its affiliates. All Rights Reserved.             Follow-ups after your visit        Your next 10 appointments already scheduled     Aug 03, 2018  8:40 AM CDT   PHYSICAL with Kelsy Espinoza MD   [facility] ([facility])    [address] [place] [id number] [phone]              Who to contact     If you have questions or need follow up information about today's clinic visit or your schedule please contact [facility] directly at [phone].  Normal or non-critical lab and imaging results will be communicated to you by MyChart, letter or phone within 4 business days after the clinic has received the results. If you do not hear from us within 7 days, please contact the clinic through MyChart or phone. If you have a critical or abnormal lab result,  "we will notify you by phone as soon as possible.  Submit refill requests through [employer] or call your pharmacy and they will forward the refill request to us. Please allow 3 business days for your refill to be completed.          Additional Information About Your Visit        [employer] Information     [employer] gives you secure access to your electronic health record. If you see a primary care provider, you can also send messages to your care team and make appointments. If you have questions, please call your primary care clinic.  If you do not have a primary care provider, please call [phone] and they will assist you.        Care EveryWhere ID     This is your Care EveryWhere ID. This could be used by other organizations to access your [facility] medical records  [id number]        Your Vitals Were     Pulse Temperature Height Pulse Oximetry BMI (Body Mass Index)       79 97.9  F (36.6  C) (Oral) 5' 5\" (1.651 m) 96% 39.47 kg/m2        Blood Pressure from Last 3 Encounters:   07/30/18 122/82   06/17/18 100/68   04/19/18 132/80    Weight from Last 3 Encounters:   07/30/18 237 lb 3.2 oz (107.6 kg)   06/17/18 231 lb (104.8 kg)   04/19/18 230 lb (104.3 kg)              Today, you had the following     No orders found for display         Today's Medication Changes          These changes are accurate as of 7/30/18 10:25 AM.  If you have any questions, ask your nurse or doctor.               Start taking these medicines.        Dose/Directions    mupirocin 2 % ointment   Commonly known as:  BACTROBAN   Used for:  Impetigo   Started by:  Teena Fields PA-C        Apply topically 3 times daily for 5 days   Quantity:  22 g   Refills:  0       permethrin 5 % cream   Commonly known as:  ELIMITE   Used for:  Rash   Started by:  Teena Fields PA-C        Apply topically once for 1 dose Massage into skin from head to foot.  Leave on for 8-14hrs and then wash off.  May repeat in 1 wk if needed. "   Quantity:  60 g   Refills:  1            Where to get your medicines      These medications were sent to [id number] - [place] - [address] AT [facility] [address]  [address] [address]     Phone:  [phone]     mupirocin 2 % ointment    permethrin 5 % cream                Primary Care Provider Office Phone # Fax #    Kelsy Fletcher Espinoza [phone]       [facility] [place] [address]        Equal Access to Services     [facility]: Hadii aad ku hadasho Soomaali, waaxda luqadaha, qaybta kaalmada adeegyada, waxay idiin hayaan adeeg kharaabelardo cannon . So [facility] [phone].    ATENCIÓN: Si habla español, tiene a miller disposición servicios gratuitos de asistencia lingüística. [facility] [phone].    We comply with applicable federal civil rights laws and [place] laws. We do not discriminate on the basis of race, color, national origin, age, disability, sex, sexual orientation, or gender identity.            Thank you!     Thank you for choosing [facility]  for your care. Our goal is always to provide you with excellent care. Hearing back from our patients is one way we can continue to improve our services. Please take a few minutes to complete the written survey that you may receive in the mail after your visit with us. Thank you!             Your Updated Medication List - Protect others around you: Learn how to safely use, store and throw away your medicines at www.disposemymeds.org.          This list is accurate as of 7/30/18 10:25 AM.  Always use your most recent med list.                   Brand Name Dispense Instructions for use Diagnosis    * ADDERALL PO      Take 10 mg by mouth daily        * ADDERALL XR PO      Take 30 mg by mouth daily        carBAMazepine 200 MG tablet    TEGretol     Take 200 mg by mouth        cyclobenzaprine 10 MG tablet    FLEXERIL     Take 10 mg by mouth        escitalopram 10 MG tablet     LEXAPRO     Take 10 mg by mouth        mupirocin 2 % ointment    BACTROBAN    22 g    Apply topically 3 times daily for 5 days    Impetigo       permethrin 5 % cream    ELIMITE    60 g    Apply topically once for 1 dose Massage into skin from head to foot.  Leave on for 8-14hrs and then wash off.  May repeat in 1 wk if needed.    Rash       vitamin D3 1000 units Caps      Take 2,000 Units by mouth        * Notice:  This list has 2 medication(s) that are the same as other medications prescribed for you. Read the directions carefully, and ask your doctor or other care provider to review them with you.

## 2018-07-30 NOTE — PATIENT INSTRUCTIONS
Impetigo  What is impetigo?   Impetigo is a skin infection caused by bacteria. It is more common in children than in adults. Impetigo is usually a mild infection but it can spread and cause serious illness if it is not treated.   How does it occur?   Impetigo is caused by bacteria. The 2 types of bacteria that cause the infection are called Staphylococcus aureus and Streptococcus pyogenes (Group A streptococcus). These bacteria can live on your skin without hurting you. However, if they get into a wound, they may cause an infection.   Impetigo is more likely to happen if you have a chronic skin condition like eczema, or when you have a scratch, scrape, insect bite, or other skin irritation that causes a break in the skin. Impetigo is more common when it is hot and humid. It is very contagious. Physical contact, including scratching, can spread the infection to other parts of the body or to other people. It can also be spread by contaminated clothing, athletic equipment, towels, bed linen, and toys.   What are the symptoms?   Impetigo can occur on any area of skin. It often appears on the face between the upper lip and nose. The infection begins as small blisters. The blisters form pus inside and then break open. The pus from the blisters dries as a gold or yellow-colored crust. The blisters or sores are painless.   How is it diagnosed?   Your healthcare provider will look at the blisters or sores on your skin. Impetigo can often be diagnosed without any tests. In some cases your provider may remove a small bit of material from one of the sores for lab tests to identify the bacteria.   How is it treated?   The treatment depends on your age and the severity and type of infection that you have. If the infection is mild, all you may need to do is keep your skin clean so the infection can heal on its own. Your healthcare provider may prescribe an antibiotic ointment to put on your skin.   You may need to put  some of the antibiotic ointment inside your nose. Some people carry the bacteria inside their nose and the infection may come back if the nose is not treated.   For larger or more serious infections, your provider may prescribe an oral antibiotic medicine or give you a shot of antibiotic medicine.   How long will the effects last?   The sores should begin to heal within 2 to 5 days after you start using an antibiotic. If you are taking an oral antibiotic, the infection usually stops being contagious after 24 hours of treatment. If you are using an antibiotic ointment instead, the sores will no longer be contagious when they stop oozing and are drying up.   How can I help take care of myself?   Follow these tips to ease the discomfort of impetigo:   Gently wash the infected area with antibacterial soap. Soak the area for 15 to 20 minutes in warm soapy water. Then gently remove the crusts.   Cover the sores with a gauze bandage to keep the infection from spreading and to prevent scratching.   Shave around sores, not over them.   Avoid touching the sores more than necessary.   If your provider prescribed an antibiotic ointment, gently pat your skin dry after you wash the infected area and put a thin layer of antibiotic ointment on it with a cotton swab (Q-Tip). Do not touch the tube of antibiotic ointment to the infected area. Also do not touch the ointment tube with the used cotton swab. If you need more ointment, use a new cotton swab. Do not use the ointment more often than directed. Wash your hands thoroughly after using this medicine.   If your provider prescribed an antibiotic to take by mouth, take all of it exactly as directed by your provider. If you stop taking the medicine too soon, the infection may not be completely gone yet or it may return.   Call your healthcare provider if:   You develop a fever.   Your skin does not begin to heal after 3 days of treatment.   How can I help prevent impetigo?   Wash  cuts and scratches, insect bites, or other breaks in the skin with warm water and soap right away to prevent infection. Cover the area with an adhesive bandage (Band-Aid).   Wash your hands often with warm water and soap for at least 15 seconds.   Do not share washcloths, towels, clothing, bath water, or personal items like razors or sellers.   Use hot, soapy water to wash clothes and linens. Dry clothes on the hot setting if you use an automatic dryer.   Shower or bathe using soap every day and after strenuous exercise.     Published by [employer].  This content is reviewed periodically and is subject to change as new health information becomes available. The information is intended to inform and educate and is not a replacement for medical evaluation, advice, diagnosis or treatment by a healthcare professional.   Developed by [employer].   ? 2010 [facility] and/or its affiliates. All Rights Reserved.   Copyright   [employer] 2011  Adult Health Advisor                       Scabies   What is scabies?   Scabies is a very contagious but treatable skin disease. A very small bug (mite) causes it. The mites burrow into the skin, causing a very itchy rash.   How does it occur?   Scabies mites live in human skin. They spread from person to person through direct contact or from clothing and bedding.   What are the symptoms?   The main symptom of scabies is a very itchy rash. It appears as tiny blisters or bumps, which break easily when scratched. The blisters are usually in a thin line.   Although the rash can start anywhere, it often starts on the hands, between the fingers or in a crease of the wrist. Other common areas for the mites are the nipples, waistline, and male genital area. After the rash begins, it can spread within a few days to the whole body.   How is it diagnosed?   Your healthcare provider will ask about your symptoms and whether you have been exposed to someone who has scabies. Your  provider will examine your rash. He or she may get a scraping from your skin to look for mites in the skin.   How is it treated?   Your healthcare provider will prescribe a skin cream that has an insecticide in it. Usually the instructions for use of the cream are as follows:   After a bath or shower, put the cream on your whole body, from the top of your head to the soles of your feet. Put the cream under your fingernails and toenails with a toothpick. Make sure you don't get the cream in your eyes.   Leave the cream on your body for 8 to 14 hours and then wash it off.   If you wash your hands or another part of your body during the 8 to 14 hours after you first put the cream on, put more cream on the area you washed.   It's easiest to put the cream on before bed and then wash it off in the morning.   The instructions for use of medicines for scabies vary somewhat, so be sure to check and follow the instructions that come with your medicine.   Your healthcare provider may prescribe an oral antihistamine medicine, such as Benadryl, Claritin, or Zyrtec, to help relieve the itching. You can also soothe itching by putting 1% hydrocortisone cream on your skin.   How long will the effects last?   You will have itching and a rash for 2 to 4 weeks after your treatment with the cream prescribed by your healthcare provider. Continuing to have the rash does not mean that the treatment didn't work or that it needs to be repeated. The symptoms will not go away until your body sheds the layers of skin that contain the bodies of the mites, their eggs, and their droppings. Keep taking antihistamines as long as you have itching.   You may need a second treatment if:   You have symptoms 4 weeks or more after your treatment with the cream.   Your symptoms get much worse after your first treatment.   What can I do to help prevent the spread of scabies?   To prevent reinfection or spread of scabies, everyone living in your home may  need to be treated at the same time.   When you start treatment, wash all the clothing, bedding, and towels that you've used in the past 2 days in hot water. Put items that can't be washed into plastic bags. You need to keep them in the bags for 3 days to kill the mites.   Written by Galo Layne MD.   Published by [employer].   Last modified: 2007-05-07   Last reviewed: 2008-06-01   This content is reviewed periodically and is subject to change as new health information becomes available. The information is intended to inform and educate and is not a replacement for medical evaluation, advice, diagnosis or treatment by a healthcare professional.   Adult Health Advisor 2009.1 Index  Adult Health Advisor 2009.1 Credits     2009 [facility] and/or its affiliates. All Rights Reserved.

## 2018-07-30 NOTE — PROGRESS NOTES
"  SUBJECTIVE:   Holland Gary is a 37 year old female who presents to clinic today for the following health issues:    Rash  Onset: 3 weeks ago    Description:     Began on nose initially     Over the past 4 days, more lesions present of the ankles, forearms, feet, waistline  Character: round, raised, painful, red  Itching (Pruritis): YES    Progression of Symptoms:  worsening    Accompanying Signs & Symptoms:  Fever: no   Body aches or joint pain: no   Sore throat symptoms: no   Recent cold symptoms: YES- runny nose    History:   Previous similar rash: no     Precipitating factors:   Exposure to similar rash: no   New exposures: None   Recent travel: no     Alleviating factors:  none  Therapies Tried and outcome: OTC cream, coconut oil    ROS:  ROS otherwise negative    OBJECTIVE:                                                    /82 (BP Location: Right arm, Cuff Size: Adult Large)  Pulse 79  Temp 97.9  F (36.6  C) (Oral)  Ht 5' 5\" (1.651 m)  Wt 237 lb 3.2 oz (107.6 kg)  SpO2 96%  BMI 39.47 kg/m2  Body mass index is 39.47 kg/(m^2).   GENERAL: alert, no distress  HENT: ear canals- normal; TMs- normal; Nose- normal; Mouth- no ulcers, no lesions  NECK: no tenderness, no adenopathy  RESP: lungs clear to auscultation - no rales, no rhonchi, no wheezes  CV: regular rates and rhythm, normal S1 S2, no S3 or S4 and no murmur, no click or rub  SKIN: inspection reveals erythema with yellow scaling of inferior and lateral right nare.   In addition, there is pinpoint lesions, some linear of the ankles, dorsal feet, waistline and back    Diagnostic test results:  No results found for this or any previous visit (from the past 24 hour(s)).     ASSESSMENT/PLAN:                                                    (R21) Rash  (primary encounter diagnosis)  Comment: likely scabies. Begin treatment as directed. Call with any questions.  Plan: permethrin (ELIMITE) 5 % cream            (L01.00) Impetigo  Comment: begin " ointment.   Plan: mupirocin (BACTROBAN) 2 % ointment            Teena Fields PA-C  [facility]

## 2018-08-08 ENCOUNTER — OFFICE VISIT (OUTPATIENT)
Dept: PEDIATRICS | Facility: CLINIC | Age: 38
End: 2018-08-08
Payer: COMMERCIAL

## 2018-08-08 VITALS
OXYGEN SATURATION: 96 % | SYSTOLIC BLOOD PRESSURE: 114 MMHG | HEIGHT: 65 IN | BODY MASS INDEX: 39.89 KG/M2 | DIASTOLIC BLOOD PRESSURE: 66 MMHG | WEIGHT: 239.4 LBS | TEMPERATURE: 97.5 F | HEART RATE: 68 BPM

## 2018-08-08 DIAGNOSIS — M25.50 MULTIPLE JOINT PAIN: ICD-10-CM

## 2018-08-08 DIAGNOSIS — E55.9 VITAMIN D DEFICIENCY: ICD-10-CM

## 2018-08-08 DIAGNOSIS — Z01.411 ENCOUNTER FOR GYNECOLOGICAL EXAMINATION WITH ABNORMAL FINDING: Primary | ICD-10-CM

## 2018-08-08 DIAGNOSIS — Z12.4 SCREENING FOR MALIGNANT NEOPLASM OF CERVIX: ICD-10-CM

## 2018-08-08 DIAGNOSIS — G43.109 MIGRAINE WITH AURA AND WITHOUT STATUS MIGRAINOSUS, NOT INTRACTABLE: ICD-10-CM

## 2018-08-08 DIAGNOSIS — M71.22 BAKER'S CYST OF KNEE, LEFT: ICD-10-CM

## 2018-08-08 DIAGNOSIS — Z13.220 SCREENING CHOLESTEROL LEVEL: ICD-10-CM

## 2018-08-08 DIAGNOSIS — Z11.3 SCREEN FOR STD (SEXUALLY TRANSMITTED DISEASE): ICD-10-CM

## 2018-08-08 DIAGNOSIS — Z13.1 SCREENING FOR DIABETES MELLITUS: ICD-10-CM

## 2018-08-08 DIAGNOSIS — F33.0 MILD EPISODE OF RECURRENT MAJOR DEPRESSIVE DISORDER (H): ICD-10-CM

## 2018-08-08 DIAGNOSIS — F98.8 ATTENTION DEFICIT DISORDER (ADD) WITHOUT HYPERACTIVITY: ICD-10-CM

## 2018-08-08 DIAGNOSIS — G50.0 TRIGEMINAL NEURALGIA: ICD-10-CM

## 2018-08-08 DIAGNOSIS — R79.82 ELEVATED C-REACTIVE PROTEIN: ICD-10-CM

## 2018-08-08 LAB
CRP SERPL-MCNC: 17 MG/L (ref 0–8)
ERYTHROCYTE [SEDIMENTATION RATE] IN BLOOD BY WESTERGREN METHOD: 13 MM/H (ref 0–20)

## 2018-08-08 PROCEDURE — 87491 CHLMYD TRACH DNA AMP PROBE: CPT | Performed by: INTERNAL MEDICINE

## 2018-08-08 PROCEDURE — 86140 C-REACTIVE PROTEIN: CPT | Performed by: INTERNAL MEDICINE

## 2018-08-08 PROCEDURE — 86200 CCP ANTIBODY: CPT | Performed by: INTERNAL MEDICINE

## 2018-08-08 PROCEDURE — 86780 TREPONEMA PALLIDUM: CPT | Performed by: INTERNAL MEDICINE

## 2018-08-08 PROCEDURE — 80053 COMPREHEN METABOLIC PANEL: CPT | Performed by: INTERNAL MEDICINE

## 2018-08-08 PROCEDURE — 82306 VITAMIN D 25 HYDROXY: CPT | Performed by: INTERNAL MEDICINE

## 2018-08-08 PROCEDURE — 99214 OFFICE O/P EST MOD 30 MIN: CPT | Mod: 25 | Performed by: INTERNAL MEDICINE

## 2018-08-08 PROCEDURE — 80061 LIPID PANEL: CPT | Performed by: INTERNAL MEDICINE

## 2018-08-08 PROCEDURE — G0145 SCR C/V CYTO,THINLAYER,RESCR: HCPCS | Performed by: INTERNAL MEDICINE

## 2018-08-08 PROCEDURE — 87591 N.GONORRHOEAE DNA AMP PROB: CPT | Performed by: INTERNAL MEDICINE

## 2018-08-08 PROCEDURE — 85652 RBC SED RATE AUTOMATED: CPT | Performed by: INTERNAL MEDICINE

## 2018-08-08 PROCEDURE — 99395 PREV VISIT EST AGE 18-39: CPT | Performed by: INTERNAL MEDICINE

## 2018-08-08 PROCEDURE — 87389 HIV-1 AG W/HIV-1&-2 AB AG IA: CPT | Performed by: INTERNAL MEDICINE

## 2018-08-08 PROCEDURE — 87624 HPV HI-RISK TYP POOLED RSLT: CPT | Performed by: INTERNAL MEDICINE

## 2018-08-08 PROCEDURE — 36415 COLL VENOUS BLD VENIPUNCTURE: CPT | Performed by: INTERNAL MEDICINE

## 2018-08-08 RX ORDER — MULTIPLE VITAMINS W/ MINERALS TAB 9MG-400MCG
1 TAB ORAL DAILY
COMMUNITY

## 2018-08-08 RX ORDER — ZOLMITRIPTAN 5 MG/1
5 TABLET, FILM COATED ORAL
Qty: 9 TABLET | Refills: 1 | Status: SHIPPED | OUTPATIENT
Start: 2018-08-08

## 2018-08-08 ASSESSMENT — ANXIETY QUESTIONNAIRES
5. BEING SO RESTLESS THAT IT IS HARD TO SIT STILL: NOT AT ALL
3. WORRYING TOO MUCH ABOUT DIFFERENT THINGS: NOT AT ALL
6. BECOMING EASILY ANNOYED OR IRRITABLE: NEARLY EVERY DAY
2. NOT BEING ABLE TO STOP OR CONTROL WORRYING: NOT AT ALL
1. FEELING NERVOUS, ANXIOUS, OR ON EDGE: NEARLY EVERY DAY
7. FEELING AFRAID AS IF SOMETHING AWFUL MIGHT HAPPEN: NOT AT ALL
IF YOU CHECKED OFF ANY PROBLEMS ON THIS QUESTIONNAIRE, HOW DIFFICULT HAVE THESE PROBLEMS MADE IT FOR YOU TO DO YOUR WORK, TAKE CARE OF THINGS AT HOME, OR GET ALONG WITH OTHER PEOPLE: EXTREMELY DIFFICULT
GAD7 TOTAL SCORE: 9

## 2018-08-08 ASSESSMENT — ENCOUNTER SYMPTOMS
CONSTIPATION: 1
DIARRHEA: 1
ARTHRALGIAS: 1
HEARTBURN: 0
PARESTHESIAS: 0
WEAKNESS: 1
EYE PAIN: 1
NERVOUS/ANXIOUS: 1
ABDOMINAL PAIN: 1
MYALGIAS: 1
HEMATOCHEZIA: 1
CHILLS: 0
JOINT SWELLING: 1
BREAST MASS: 0
COUGH: 0
DYSURIA: 0
PALPITATIONS: 0
FEVER: 0
NAUSEA: 0
DIZZINESS: 1
FREQUENCY: 1
HEADACHES: 1
HEMATURIA: 0
SHORTNESS OF BREATH: 1
SORE THROAT: 0

## 2018-08-08 ASSESSMENT — PATIENT HEALTH QUESTIONNAIRE - PHQ9
SUM OF ALL RESPONSES TO PHQ QUESTIONS 1-9: 12
10. IF YOU CHECKED OFF ANY PROBLEMS, HOW DIFFICULT HAVE THESE PROBLEMS MADE IT FOR YOU TO DO YOUR WORK, TAKE CARE OF THINGS AT HOME, OR GET ALONG WITH OTHER PEOPLE: VERY DIFFICULT
SUM OF ALL RESPONSES TO PHQ QUESTIONS 1-9: 12
5. POOR APPETITE OR OVEREATING: NEARLY EVERY DAY
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
## 2018-08-08 NOTE — MR AVS SNAPSHOT
After Visit Summary   8/8/2018    Holland Gary    MRN: [id number]           Patient Information     Date Of Birth          1980        Visit Information        Provider Department      8/8/2018 11:20 AM Kelsy Espinoza MD [facility]        Today's Diagnoses     Encounter for gynecological examination with abnormal finding    -  1    Elevated C-reactive protein        Screening for malignant neoplasm of cervix        Vitamin D deficiency        Multiple joint pain        Migraine with aura and without status migrainosus, not intractable        Screening cholesterol level        Screening for diabetes mellitus          Care Instructions      Preventive Health Recommendations  Female Ages 26 - 39  Yearly exam:   See your health care provider every year in order to    Review health changes.     Discuss preventive care.      Review your medicines if you your doctor has prescribed any.    Until age 30: Get a Pap test every three years (more often if you have had an abnormal result).    After age 30: Talk to your doctor about whether you should have a Pap test every 3 years or have a Pap test with HPV screening every 5 years.   You do not need a Pap test if your uterus was removed (hysterectomy) and you have not had cancer.  You should be tested each year for STDs (sexually transmitted diseases), if you're at risk.   Talk to your provider about how often to have your cholesterol checked.  If you are at risk for diabetes, you should have a diabetes test (fasting glucose).  Shots: Get a flu shot each year. Get a tetanus shot every 10 years.   Nutrition:     Eat at least 5 servings of fruits and vegetables each day.    Eat whole-grain bread, whole-wheat pasta and brown rice instead of white grains and rice.    Get adequate Calcium and Vitamin D.     Lifestyle    Exercise at least 150 minutes a week (30 minutes a day, 5 days of the week). This will help you control your weight and  prevent disease.    Limit alcohol to one drink per day.    No smoking.     Wear sunscreen to prevent skin cancer.    See your dentist every six months for an exam and cleaning.  ------------  1. Follow-up with orthopedics about baker's cyst  2. Labs today: cholesterol, diabetes screen, liver function, kidney function, CRP, ESR (other inflammation test), vitamin D levels, ccp antibody (other rheumatoid arthritis test)  3. Recommend get vision checked  4. For migraines - try Zomig as soon as start to get symptoms  - can take with a naproxen if not fully helping the symptoms  5. Pap today with HPV test  6. If labs ok, then can schedule with Rheumatology to discuss joint pains - can call to schedule          Follow-ups after your visit        Additional Services     RHEUMATOLOGY REFERRAL       Your provider has referred you to: [facility]:  [facility]   [phone] [email or web address]    Please be aware that coverage of these services is subject to the terms and limitations of your health insurance plan.  Call member services at your health plan with any benefit or coverage questions.      Please bring the following with you to your appointment:    (1) Any X-Rays, CTs or MRIs which have been performed.  Contact the facility where they were done to arrange for  prior to your scheduled appointment.    (2) List of current medications   (3) This referral request   (4) Any documents/labs given to you for this referral                  Follow-up notes from your care team     Return in about 1 year (around 8/8/2019).      Who to contact     If you have questions or need follow up information about today's clinic visit or your schedule please contact [facility] directly at [phone].  Normal or non-critical lab and imaging results will be communicated to you by MyChart, letter or phone within 4 business days after the clinic has received the results. If you do not hear from us  "within 7 days, please contact the clinic through [employer] or phone. If you have a critical or abnormal lab result, we will notify you by phone as soon as possible.  Submit refill requests through [employer] or call your pharmacy and they will forward the refill request to us. Please allow 3 business days for your refill to be completed.          Additional Information About Your Visit        [employer] Information     [employer] gives you secure access to your electronic health record. If you see a primary care provider, you can also send messages to your care team and make appointments. If you have questions, please call your primary care clinic.  If you do not have a primary care provider, please call [phone] and they will assist you.        Care EveryWhere ID     This is your Care EveryWhere ID. This could be used by other organizations to access your [place] medical records  [id number]        Your Vitals Were     Pulse Temperature Height Last Period Pulse Oximetry BMI (Body Mass Index)    68 97.5  F (36.4  C) (Tympanic) 5' 5\" (1.651 m) 07/23/2018 (Approximate) 96% 39.84 kg/m2       Blood Pressure from Last 3 Encounters:   08/08/18 114/66   07/30/18 122/82   06/17/18 100/68    Weight from Last 3 Encounters:   08/08/18 239 lb 6.4 oz (108.6 kg)   07/30/18 237 lb 3.2 oz (107.6 kg)   06/17/18 231 lb (104.8 kg)              We Performed the Following     Comprehensive metabolic panel     CRP inflammation     Cyclic Citrullinated Peptide Antibody IgG     Erythrocyte sedimentation rate auto     HPV High Risk Types DNA Cervical     Lipid panel reflex to direct LDL Fasting     Pap imaged thin layer screen with HPV - recommended age 30 - 65 years (select HPV order below)     RHEUMATOLOGY REFERRAL     Vitamin D Deficiency          Today's Medication Changes          These changes are accurate as of 8/8/18 12:34 PM.  If you have any questions, ask your nurse or doctor.               Start taking these medicines.        " Dose/Directions    ZOLMitriptan 5 MG tablet   Commonly known as:  ZOMIG   Used for:  Migraine with aura and without status migrainosus, not intractable   Started by:  Kelsy Espinoza MD        Dose:  5 mg   Take 1 tablet (5 mg) by mouth at onset of headache for migraine May repeat in 2 hours. Max 2 tablets/24 hours.   Quantity:  9 tablet   Refills:  1            Where to get your medicines      These medications were sent to [id number] - [place] - [address] AT [facility]  [address] [address]     Phone:  [phone]     ZOLMitriptan 5 MG tablet                Primary Care Provider Office Phone # Fax #    Kelsy Espinoza [phone]       [facility] [facility] [address] [place] [address]        Equal Access to Services     [facility]: Hadii aad ku hadasho Soomaali, waaxda luqadaha, qaybta kaalmada adeegyada, letha uribein haybrielle cannon . So [facility] [phone].    ATENCIÓN: Si habla español, tiene a miller disposición servicios gratuitos de asistencia lingüística. [facility] [phone].    We comply with applicable federal civil rights laws and [place] laws. We do not discriminate on the basis of race, color, national origin, age, disability, sex, sexual orientation, or gender identity.            Thank you!     Thank you for choosing [facility]  for your care. Our goal is always to provide you with excellent care. Hearing back from our patients is one way we can continue to improve our services. Please take a few minutes to complete the written survey that you may receive in the mail after your visit with us. Thank you!             Your Updated Medication List - Protect others around you: Learn how to safely use, store and throw away your medicines at www.disposemymeds.org.          This list is accurate as of 8/8/18 12:34 PM.  Always use your most recent med list.                   Brand Name Dispense Instructions  for use Diagnosis    * ADDERALL PO      Take 10 mg by mouth daily        * ADDERALL XR PO      Take 30 mg by mouth daily        carBAMazepine 200 MG tablet    TEGretol     Take 200 mg by mouth        cyclobenzaprine 10 MG tablet    FLEXERIL     Take 10 mg by mouth        escitalopram 10 MG tablet    LEXAPRO     Take 10 mg by mouth        Multi-vitamin Tabs tablet      Take 1 tablet by mouth daily        vitamin D3 1000 units Caps      Take 2,000 Units by mouth        ZOLMitriptan 5 MG tablet    ZOMIG    9 tablet    Take 1 tablet (5 mg) by mouth at onset of headache for migraine May repeat in 2 hours. Max 2 tablets/24 hours.    Migraine with aura and without status migrainosus, not intractable       * Notice:  This list has 2 medication(s) that are the same as other medications prescribed for you. Read the directions carefully, and ask your doctor or other care provider to review them with you.

## 2018-08-09 LAB
ALBUMIN SERPL-MCNC: 3.6 G/DL (ref 3.4–5)
ALP SERPL-CCNC: 63 U/L (ref 40–150)
ALT SERPL W P-5'-P-CCNC: 19 U/L (ref 0–50)
ANION GAP SERPL CALCULATED.3IONS-SCNC: 6 MMOL/L (ref 3–14)
AST SERPL W P-5'-P-CCNC: 13 U/L (ref 0–45)
BILIRUB SERPL-MCNC: 0.4 MG/DL (ref 0.2–1.3)
BUN SERPL-MCNC: 12 MG/DL (ref 7–30)
C TRACH DNA SPEC QL NAA+PROBE: NEGATIVE
CALCIUM SERPL-MCNC: 8.5 MG/DL (ref 8.5–10.1)
CHLORIDE SERPL-SCNC: 104 MMOL/L (ref 94–109)
CHOLEST SERPL-MCNC: 225 MG/DL
CO2 SERPL-SCNC: 29 MMOL/L (ref 20–32)
CREAT SERPL-MCNC: 0.81 MG/DL (ref 0.52–1.04)
DEPRECATED CALCIDIOL+CALCIFEROL SERPL-MC: 38 UG/L (ref 20–75)
GFR SERPL CREATININE-BSD FRML MDRD: 78 ML/MIN/1.7M2
GLUCOSE SERPL-MCNC: 91 MG/DL (ref 70–99)
HDLC SERPL-MCNC: 51 MG/DL
HIV 1+2 AB+HIV1 P24 AG SERPL QL IA: NONREACTIVE
LDLC SERPL CALC-MCNC: 144 MG/DL
N GONORRHOEA DNA SPEC QL NAA+PROBE: NEGATIVE
NONHDLC SERPL-MCNC: 174 MG/DL
POTASSIUM SERPL-SCNC: 4.2 MMOL/L (ref 3.4–5.3)
PROT SERPL-MCNC: 7.4 G/DL (ref 6.8–8.8)
SODIUM SERPL-SCNC: 139 MMOL/L (ref 133–144)
SPECIMEN SOURCE: NORMAL
SPECIMEN SOURCE: NORMAL
T PALLIDUM AB SER QL: NONREACTIVE
TRIGL SERPL-MCNC: 148 MG/DL

## 2018-08-10 LAB
CCP AB SER IA-ACNC: 1 U/ML
COPATH REPORT: NORMAL
PAP: NORMAL

## 2018-08-10 ASSESSMENT — ANXIETY QUESTIONNAIRES: GAD7 TOTAL SCORE: 9

## 2018-08-13 LAB
FINAL DIAGNOSIS: NORMAL
HPV HR 12 DNA CVX QL NAA+PROBE: NEGATIVE
HPV16 DNA SPEC QL NAA+PROBE: NEGATIVE
HPV18 DNA SPEC QL NAA+PROBE: NEGATIVE
SPECIMEN DESCRIPTION: NORMAL
SPECIMEN SOURCE CVX/VAG CYTO: NORMAL

## 2018-08-15 PROBLEM — Z98.890 HISTORY OF COLPOSCOPY: Status: ACTIVE | Noted: 2018-08-15

## 2018-08-16 PROBLEM — G43.109 MIGRAINE WITH AURA AND WITHOUT STATUS MIGRAINOSUS, NOT INTRACTABLE: Status: ACTIVE | Noted: 2018-08-08

## 2018-08-16 PROBLEM — G43.109 MIGRAINE WITH AURA AND WITHOUT STATUS MIGRAINOSUS, NOT INTRACTABLE: Status: ACTIVE | Noted: 2018-08-16

## 2018-11-30 ENCOUNTER — OFFICE VISIT (OUTPATIENT)
Dept: PEDIATRICS | Facility: CLINIC | Age: 38
End: 2018-11-30
Payer: COMMERCIAL

## 2018-11-30 VITALS
OXYGEN SATURATION: 96 % | HEART RATE: 76 BPM | BODY MASS INDEX: 38.82 KG/M2 | DIASTOLIC BLOOD PRESSURE: 84 MMHG | TEMPERATURE: 97.9 F | HEIGHT: 65 IN | SYSTOLIC BLOOD PRESSURE: 120 MMHG | WEIGHT: 233 LBS

## 2018-11-30 DIAGNOSIS — J06.9 VIRAL URI WITH COUGH: Primary | ICD-10-CM

## 2018-11-30 PROCEDURE — 99213 OFFICE O/P EST LOW 20 MIN: CPT | Performed by: PHYSICIAN ASSISTANT

## 2018-11-30 NOTE — MR AVS SNAPSHOT
"              After Visit Summary   11/30/2018    Holland Gary    MRN: [id number]           Patient Information     Date Of Birth          1980        Visit Information        Provider Department      11/30/2018 2:50 PM Teena Fields PA-C [facility]        Today's Diagnoses     Viral URI with cough    -  1      Care Instructions    Call if symptoms persist          Follow-ups after your visit        Who to contact     If you have questions or need follow up information about today's clinic visit or your schedule please contact [facility] directly at [phone].  Normal or non-critical lab and imaging results will be communicated to you by MyChart, letter or phone within 4 business days after the clinic has received the results. If you do not hear from us within 7 days, please contact the clinic through [employer] [facility] or phone. If you have a critical or abnormal lab result, we will notify you by phone as soon as possible.  Submit refill requests through [employer] or call your pharmacy and they will forward the refill request to us. Please allow 3 business days for your refill to be completed.          Additional Information About Your Visit        MyChart Information     [employer] gives you secure access to your electronic health record. If you see a primary care provider, you can also send messages to your care team and make appointments. If you have questions, please call your primary care clinic.  If you do not have a primary care provider, please call [phone] and they will assist you.        Care EveryWhere ID     This is your Care EveryWhere ID. This could be used by other organizations to access your [place] medical records  [id number]        Your Vitals Were     Pulse Temperature Height Pulse Oximetry BMI (Body Mass Index)       76 97.9  F (36.6  C) (Tympanic) 5' 5\" (1.651 m) 96% 38.77 kg/m2        Blood Pressure from Last 3 Encounters:   11/30/18 120/84   08/08/18 " 114/66   07/30/18 122/82    Weight from Last 3 Encounters:   11/30/18 233 lb (105.7 kg)   08/08/18 239 lb 6.4 oz (108.6 kg)   07/30/18 237 lb 3.2 oz (107.6 kg)              Today, you had the following     No orders found for display       Primary Care Provider Office Phone # Fax #    Kelsy Espinoza [phone] [address] [place] [address]        Equal Access to Services     [facility] : Hadii aad ku hadasho Soomaali, waaxda luqadaha, qaybta kaalmada adeegyada, waxay idiin hayaan nova cannon . So [facility] [phone].    ATENCIÓN: Si habla español, tiene a miller disposición servicios gratuitos de asistencia lingüística. [facility] [phone].    We comply with applicable federal civil rights laws and [place] laws. We do not discriminate on the basis of race, color, national origin, age, disability, sex, sexual orientation, or gender identity.            Thank you!     [facility]. Our goal is always to provide you with excellent care. Hearing back from our patients is one way we can continue to improve our services. Please take a few minutes to complete the written survey that you may receive in the mail after your visit with us. Thank you!             Your Updated Medication List - Protect others around you: Learn how to safely use, store and throw away your medicines at www.disposemymeds.org.          This list is accurate as of 11/30/18  3:49 PM.  Always use your most recent med list.                   Brand Name Dispense Instructions for use Diagnosis    * ADDERALL PO      Take 10 mg by mouth daily        * ADDERALL XR PO      Take 30 mg by mouth daily        carBAMazepine 200 MG tablet    TEGretol     Take 200 mg by mouth        cyclobenzaprine 10 MG tablet    FLEXERIL     Take 10 mg by mouth        escitalopram 10 MG tablet    LEXAPRO     Take 10 mg by mouth        Multi-vitamin tablet      Take 1 tablet by  mouth daily        ZOLMitriptan 5 MG tablet    ZOMIG    9 tablet    Take 1 tablet (5 mg) by mouth at onset of headache for migraine May repeat in 2 hours. Max 2 tablets/24 hours.    Migraine with aura and without status migrainosus, not intractable       * Notice:  This list has 2 medication(s) that are the same as other medications prescribed for you. Read the directions carefully, and ask your doctor or other care provider to review them with you.

## 2018-11-30 NOTE — PROGRESS NOTES
"  SUBJECTIVE:   Holland Gary is a 37 year old female who presents to clinic today for the following health issues:    Acute Illness   Acute illness concerns: throat  Onset: x4 days    Fever: no    Chills/Sweats: no    Headache (location?): YES    Sinus Pressure:no    Conjunctivitis:  no    Ear Pain: YES: left    Rhinorrhea: no    Congestion: YES- chest    Sore Throat: YES     Cough: YES-non-productive, productive of yellow sputum    Wheeze: no    Decreased Appetite: YES    Nausea: YES    Vomiting: no    Diarrhea:  no    Dysuria/Freq.: no    Fatigue/Achiness: YES    Sick/Strep Exposure: YES- child     Therapies Tried and outcome: Dayquil    ROS:  ROS otherwise negative    OBJECTIVE:                                                    /84 (BP Location: Right arm, Cuff Size: Adult Large)  Pulse 76  Temp 97.9  F (36.6  C) (Tympanic)  Ht 5' 5\" (1.651 m)  Wt 233 lb (105.7 kg)  SpO2 96%  BMI 38.77 kg/m2  Body mass index is 38.77 kg/(m^2).   GENERAL: alert, no distress  HENT: ear canals- normal; TMs- normal; Nose- normal; Mouth- no ulcers, no lesions; mild max sinus tenderness  NECK: tonsillar LAD  RESP: lungs clear to auscultation - no rales, no rhonchi, no wheezes  CV: regular rates and rhythm, normal S1 S2, no S3 or S4 and no murmur, no click or rub    Diagnostic test results:  No results found for this or any previous visit (from the past 24 hour(s)).     ASSESSMENT/PLAN:                                                    (J06.9,  B97.89) Viral URI with cough  (primary encounter diagnosis)  Comment: continue with symptomatic treatment.  Plan:     Teena Fields PA-C  [facility] [place]  "

## 2018-12-03 ENCOUNTER — TELEPHONE (OUTPATIENT)
Dept: PEDIATRICS | Facility: CLINIC | Age: 38
End: 2018-12-03

## 2018-12-03 DIAGNOSIS — J01.90 ACUTE SINUSITIS WITH SYMPTOMS > 10 DAYS: Primary | ICD-10-CM

## 2018-12-03 NOTE — TELEPHONE ENCOUNTER
The pt is aware and has no further questions at this time.   Tricia Noguera on 12/3/2018 at 11:33 AM

## 2018-12-03 NOTE — TELEPHONE ENCOUNTER
Reason for Call:  Other prescription    Detailed comments: The pt was calling and she was seen by Yuki on 11/30/18 and she still isn't feeling any better and she is wondering if she can get a rx sent over to the selected pharmacy.     Phone Number Patient can be reached at: Home number on file [phone] (home)    Best Time: Anytime    Can we leave a detailed message on this number? YES    Call taken on 12/3/2018 at 7:34 AM by Tricia Noguera

## 2018-12-03 NOTE — TELEPHONE ENCOUNTER
Called pt back.     - still has productive cough(worse at night),  Bilateral ear pain(R ear hurts as well), chest congestion, lethargy, decreased appetite & body aches  - lymph nodes aren't swollen any more  - denies fever, trouble breathing, wheezing, SOB, dizziness or GI sx's  - tried cough drops, dayquil & teas - not helping  - missed work again today  - requesting abx as discussed during OV    Please advise. Need to notify pt.     Notes from 11/30:    Return in about 3 days (around 12/3/2018) for if symptoms worsen or fail to improve.   Call if symptoms persist     Noé, RN  Triage Nurse

## 2019-03-11 PROBLEM — M25.562 KNEE PAIN, LEFT: Status: RESOLVED | Noted: 2018-04-24 | Resolved: 2019-03-11

## 2019-03-11 NOTE — PROGRESS NOTES
Discharge Note    Progress reporting period is from initial evaluation date (please see noted date below) to Jun 14, 2018.  Linked Episodes   Type: Episode: Status: Noted: Resolved: Last update: Updated by:   PHYSICAL THERAPY Left Knee 4/24/18 Active 4/24/2018 6/14/2018  2:41 PM Silvano Adame, PT      Comments:       Holland failed to follow up and current status is unknown.  Please see information below for last relevant information on current status.  Patient seen for 5 visits.    SUBJECTIVE  Subjective changes noted by patient:  Upper back has been stiff.  Knee is more stiff than anything and pain is better.  The pain comes and goes.  .  Current pain level is 3/10.     Previous pain level was  6/10.   Changes in function:  Yes (See Goal flowsheet attached for changes in current functional level)  Adverse reaction to treatment or activity: None    OBJECTIVE  Changes noted in objective findings: Knee extension 5/5.      ASSESSMENT/PLAN  Diagnosis: Left Knee   Updated problem list and treatment plan:   Pain - HEP  Decreased ROM/flexibility - HEP  Decreased function - HEP  Decreased strength - HEP  STG/LTGs have been met or progress has been made towards goals:  Yes, please see goal flowsheet for most current information  Assessment of Progress: current status is unknown.    Last current status: Pt is progressing as expected   Self Management Plans:  HEP  I have re-evaluated this patient and find that the nature, scope, duration and intensity of the therapy is appropriate for the medical condition of the patient.  Holland continues to require the following intervention to meet STG and LTG's:  HEP.    Recommendations:  Discharge with current home program.  Patient to follow up with MD as needed.    Please refer to the daily flowsheet for treatment today, total treatment time and time spent performing 1:1 timed codes.

## 2019-03-26 ENCOUNTER — OFFICE VISIT (OUTPATIENT)
Dept: PEDIATRICS | Facility: CLINIC | Age: 39
End: 2019-03-26
Payer: COMMERCIAL

## 2019-03-26 VITALS
DIASTOLIC BLOOD PRESSURE: 76 MMHG | OXYGEN SATURATION: 97 % | SYSTOLIC BLOOD PRESSURE: 112 MMHG | HEIGHT: 65 IN | WEIGHT: 244.3 LBS | HEART RATE: 81 BPM | BODY MASS INDEX: 40.7 KG/M2 | TEMPERATURE: 98.1 F

## 2019-03-26 DIAGNOSIS — M54.42 ACUTE MIDLINE LOW BACK PAIN WITH LEFT-SIDED SCIATICA: Primary | ICD-10-CM

## 2019-03-26 PROCEDURE — 99214 OFFICE O/P EST MOD 30 MIN: CPT | Performed by: PHYSICIAN ASSISTANT

## 2019-03-26 RX ORDER — HYDROCODONE BITARTRATE AND ACETAMINOPHEN 5; 325 MG/1; MG/1
1 TABLET ORAL EVERY 6 HOURS PRN
Qty: 10 TABLET | Refills: 0 | Status: SHIPPED | OUTPATIENT
Start: 2019-03-26 | End: 2019-07-24

## 2019-03-26 ASSESSMENT — MIFFLIN-ST. JEOR: SCORE: 1784.02

## 2019-03-26 NOTE — LETTER
March 26, 2019      Holland Gary  [address]  [place] [address]        To Whom It May Concern:    Holland Gary  was seen on 3/26/19.  Please excuse her from 3/25-3/26 due to illness.        Sincerely,        Teena Fields PA-C

## 2019-03-26 NOTE — PROGRESS NOTES
"  SUBJECTIVE:   Holland Gary is a 38 year old female who presents to clinic today for the following health issues:    Back Pain     Duration: 1 week        Specific cause: none    Description:   Location of pain: hip bilateral and legs bilateral  Character of pain: stabbing and tingling, numbness  Pain radiation: radiates into the left buttocks and radiates into the left leg  New numbness or weakness in legs, not attributed to pain:  YES    Intensity: moderate    History:   Pain interferes with job: YES  History of back problems: no prior back problems  Any previous MRI or X-rays: Yes--at [facility].  Date 11 yrs  Sees a specialist for back pain:  No  Therapies tried without relief: active    Alleviating factors:   Improved by: stretch and laying still     Precipitating factors:  Worsened by: Bending and Sitting    Accompanying Signs & Symptoms:  Risk of Fracture:  None  Risk of Cauda Equina:  None  Risk of Infection:  None  Risk of Cancer:  None  Risk of Ankylosing Spondylitis:  Onset at age <35, male, AND morning back stiffness. no     ROS:  ROS otherwise negative    OBJECTIVE:                                                    /76 (BP Location: Right arm, Patient Position: Sitting, Cuff Size: Adult Large)   Pulse 81   Temp 98.1  F (36.7  C) (Tympanic)   Ht 1.651 m (5' 5\")   Wt 110.8 kg (244 lb 4.8 oz)   SpO2 97%   BMI 40.65 kg/m    Body mass index is 40.65 kg/m .   GENERAL: healthy, alert, no distress  RESP: lungs clear to auscultation - no rales, no rhonchi, no wheezes  CV: regular rates and rhythm, normal S1 S2, no S3 or S4 and no murmur, no click or rub   Lumber/Thoracic Spine Exam: Tender:  left para lumbar muscles  Range of Motion:  lumbar flexion  full, lumbar extension  full, left lateral lumbar bending  decreased, right lateral lumbar bending  decreased  Strength: full  Special tests:  positive left straight leg raise, positive right straight leg raise    Diagnostic Test Results:  No " results found for this or any previous visit (from the past 24 hour(s)).     ASSESSMENT/PLAN:                                                    (M54.42) Acute midline low back pain with left-sided sciatica  (primary encounter diagnosis)  Comment: continue with heat and ibuprofen. May begin Norco PRN severe pain. Begin physical therapy.   Plan: [facility] PT, HAND, AND CHIROPRACTIC REFERRAL,         HYDROcodone-acetaminophen (NORCO) 5-325 MG         Tablet     Teena Fields PA-C  [facility]

## 2019-04-04 ENCOUNTER — THERAPY VISIT (OUTPATIENT)
Dept: PHYSICAL THERAPY | Facility: CLINIC | Age: 39
End: 2019-04-04
Attending: PHYSICIAN ASSISTANT
Payer: COMMERCIAL

## 2019-04-04 DIAGNOSIS — M54.50 LOW BACK PAIN: ICD-10-CM

## 2019-04-04 DIAGNOSIS — M54.42 ACUTE MIDLINE LOW BACK PAIN WITH LEFT-SIDED SCIATICA: ICD-10-CM

## 2019-04-04 PROCEDURE — 97161 PT EVAL LOW COMPLEX 20 MIN: CPT | Mod: GP | Performed by: PHYSICAL THERAPIST

## 2019-04-04 PROCEDURE — 97110 THERAPEUTIC EXERCISES: CPT | Mod: GP | Performed by: PHYSICAL THERAPIST

## 2019-04-04 NOTE — PROGRESS NOTES
[facility] Initial Evaluation  Subjective:  The history is provided by the patient. No  was used.   Holland Gary is a 38 year old female with a lumbar condition.  Condition occurred with:  Insidious onset.  Condition occurred: for unknown reasons.  This is a chronic condition  Patient reports having chronic LBP for 25 years.  Patient began having numbness into bilateral buttock 2 months ago.  Numbness spread into B thighs 2 weeks ago. Patient c/o pain and numbness with sitting.  Repeating bending causes increased low back pain.  Standing, but not walking, causes increased LBP.  Physical therapy was ordered 3/26/2019.    Patient reports pain:  Lower lumbar spine.  Radiates to:  Thigh left and thigh right (thumbness, into left foot 1x).  Pain is described as sharp and aching and is constant and reported as 4/10.  Associated symptoms:  Loss of motion/stiffness and numbness. Pain is the same all the time.  Symptoms are exacerbated by sitting, standing and bending and relieved by activity/movement and analgesics.  Since onset symptoms are gradually improving (thigh thumbness is less).  Special testing: none recently.  Previous treatment includes chiropractic.  There was mild and moderate improvement following previous treatment.  General health as reported by patient is fair.  Pertinent medical history includes:  Depression, overweight, migraines/headaches and heart problems.  Medical allergies: no.  Other surgeries include:  Other.  Current medications:  Anti-depressants and other (anxiety, ADHD).    Patient is working in normal job without restrictions.  Primary job tasks include:  Prolonged sitting.    Barriers include:  None as reported by the patient.    Red flags:  None as reported by the patient.                        Objective:  Standing Alignment:        Lumbar:  Lordosis incr            Gait:    Gait Type:  Normal                    Lumbar/SI Evaluation  ROM:    AROM  Lumbar:   Flexion:          WNL  Ext:                    Min Loss   Side Bend:        Left:     Right:   Rotation:           Left:     Right:   Side Glide:        Left:  WNL    Right:  WNL - ERP        Strength: Poor core strength  Lumbar Myotomes:  normal                  Neural Tension/Mobility:      Left side:SLR or SLR w/DF  negative.     Right side:   SLR w/DF or SLR  negative.   Lumbar Palpation:  Palpation (lumbar): General tenderness across low back.        Lumbar Provocation:      Left negative with:  Mobility and PROM hip    Right negative with:  Mobility and PROM hip  Spinal Segmental Conclusions: Generally good segmental mobility lumbar spine                                                       General     ROS    Assessment/Plan:    Patient is a 38 year old female with lumbar complaints.    Patient has the following significant findings with corresponding treatment plan.                Diagnosis 1:  Lumbar derangement  Pain -  self management, education and home program  Decreased ROM/flexibility - therapeutic exercise, therapeutic activity and home program  Decreased strength - therapeutic exercise, therapeutic activities and home program  Impaired muscle performance - neuro re-education and home program  Decreased function - therapeutic activities and home program    Therapy Evaluation Codes:   1) History comprised of:   Personal factors that impact the plan of care:      None.    Comorbidity factors that impact the plan of care are:      Depression and Overweight.     Medications impacting care: Anti-depressant and Pain.  2) Examination of Body Systems comprised of:   Body structures and functions that impact the plan of care:      Lumbar spine.   Activity limitations that impact the plan of care are:      Bending, Sitting and Standing.  3) Clinical presentation characteristics are:   Stable/Uncomplicated.  4) Decision-Making    Low complexity using standardized patient assessment instrument and/or  measureable assessment of functional outcome.  Cumulative Therapy Evaluation is: Low complexity.    Previous and current functional limitations:  (See Goal Flow Sheet for this information)    Short term and Long term goals: (See Goal Flow Sheet for this information)     Communication ability:  Patient appears to be able to clearly communicate and understand verbal and written communication and follow directions correctly.  Treatment Explanation - The following has been discussed with the patient:   RX ordered/plan of care  Anticipated outcomes  Possible risks and side effects  This patient would benefit from PT intervention to resume normal activities.   Rehab potential is good.    Frequency:  1 X week, once daily  Duration:  for 6 weeks  Discharge Plan:  Achieve all LTG.  Independent in home treatment program.  Reach maximal therapeutic benefit.    Please refer to the daily flowsheet for treatment today, total treatment time and time spent performing 1:1 timed codes.

## 2019-06-25 PROBLEM — M54.50 LOW BACK PAIN: Status: RESOLVED | Noted: 2019-04-04 | Resolved: 2019-06-25

## 2019-07-16 ENCOUNTER — HOSPITAL ENCOUNTER (OUTPATIENT)
Dept: GENERAL RADIOLOGY | Facility: CLINIC | Age: 39
End: 2019-07-16
Payer: COMMERCIAL

## 2019-07-16 ENCOUNTER — HOSPITAL ENCOUNTER (OUTPATIENT)
Dept: GENERAL RADIOLOGY | Facility: CLINIC | Age: 39
Discharge: HOME | End: 2019-07-16
Admitting: PHYSICIAN ASSISTANT
Payer: COMMERCIAL
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
## 2019-07-16 PROCEDURE — 73562 X-RAY EXAM OF KNEE 3: CPT | Mod: 50

## 2019-07-16 PROCEDURE — 73610 X-RAY EXAM OF ANKLE: CPT | Mod: 50

## 2019-07-16 PROCEDURE — 73620 X-RAY EXAM OF FOOT: CPT | Mod: LT

## 2019-07-18 ENCOUNTER — TELEPHONE (OUTPATIENT)
Dept: PEDIATRICS | Facility: CLINIC | Age: 39
End: 2019-07-18

## 2019-07-18 NOTE — TELEPHONE ENCOUNTER
Patient had xrays done at [facility], and is asking for a nurse to call and discuss the results with her, if someone is able to. I advised patient I can see results, but she would like to speak with someone to possibly explain further.  If someone is able/willing to, please call patient Holland Gary at [phone].   Thanks.

## 2019-07-24 ENCOUNTER — OFFICE VISIT (OUTPATIENT)
Dept: PODIATRY | Facility: CLINIC | Age: 39
End: 2019-07-24
Payer: COMMERCIAL

## 2019-07-24 VITALS
BODY MASS INDEX: 39.15 KG/M2 | DIASTOLIC BLOOD PRESSURE: 80 MMHG | SYSTOLIC BLOOD PRESSURE: 116 MMHG | WEIGHT: 235 LBS | HEIGHT: 65 IN

## 2019-07-24 DIAGNOSIS — Z87.828 HISTORY OF SPRAIN OF BOTH ANKLES: ICD-10-CM

## 2019-07-24 DIAGNOSIS — M25.572 ACUTE BILATERAL ANKLE PAIN: Primary | ICD-10-CM

## 2019-07-24 DIAGNOSIS — M76.72 PERONEAL TENDINITIS OF BOTH LOWER LEGS: ICD-10-CM

## 2019-07-24 DIAGNOSIS — M25.571 ACUTE BILATERAL ANKLE PAIN: Primary | ICD-10-CM

## 2019-07-24 DIAGNOSIS — M76.71 PERONEAL TENDINITIS OF BOTH LOWER LEGS: ICD-10-CM

## 2019-07-24 DIAGNOSIS — Q66.70 PES CAVUS: ICD-10-CM

## 2019-07-24 PROCEDURE — 99213 OFFICE O/P EST LOW 20 MIN: CPT | Performed by: PODIATRIST

## 2019-07-24 ASSESSMENT — MIFFLIN-ST. JEOR: SCORE: 1741.83

## 2019-07-24 NOTE — PROGRESS NOTES
"PATIENT HISTORY:    Holland Gary is a 38 year old female who presents to clinic for assessment of ankles. Notes that she falls frequently.  States a week ago, her right ankle \"gave out\" and she fell on hands and knees.  Also her left ankle but doesn't remember hitting it on anything. Did have xrays on knees, ankles and feet. Ankles hurt on the outside of the ankle.     Review of Systems:  Patient denies fever, chills, rash, wound, stiffness,numbness, weakness, heart burn, blood in stool, chest pain with activity, calf pain when walking, shortness of breath with activity, chronic cough, easy bleeding/bruising, swelling of ankles, excessive thirst, fatigue, depression, anxiety.  Patient admits to limping.     PAST MEDICAL HISTORY:   Past Medical History:   Diagnosis Date     Trigeminal neuralgia         PAST SURGICAL HISTORY:   Past Surgical History:   Procedure Laterality Date     left wrist ORIF Left 1993     TONSILLECTOMY       TUBAL LIGATION          MEDICATIONS:   Current Outpatient Medications:      Amphetamine-Dextroamphetamine (ADDERALL PO), Take 10 mg by mouth daily, Disp: , Rfl:      Amphetamine-Dextroamphetamine (ADDERALL XR PO), Take 20 mg by mouth daily , Disp: , Rfl:      carBAMazepine (TEGRETOL) 200 MG tablet, Take 200 mg by mouth as needed , Disp: , Rfl:      multivitamin, therapeutic with minerals (MULTI-VITAMIN) TABS tablet, Take 1 tablet by mouth daily, Disp: , Rfl:      ZOLMitriptan (ZOMIG) 5 MG tablet, Take 1 tablet (5 mg) by mouth at onset of headache for migraine May repeat in 2 hours. Max 2 tablets/24 hours., Disp: 9 tablet, Rfl: 1     escitalopram (LEXAPRO) 10 MG tablet, Take 10 mg by mouth, Disp: , Rfl:      ALLERGIES:  No Known Allergies     SOCIAL HISTORY:   Social History     Socioeconomic History     Marital status: Single     Spouse name: Not on file     Number of children: Not on file     Years of education: Not on file     Highest education level: Not on file   Occupational History " "    Not on file   Social Needs     Financial resource strain: Not on file     Food insecurity:     Worry: Not on file     Inability: Not on file     Transportation needs:     Medical: Not on file     Non-medical: Not on file   Tobacco Use     Smoking status: Former Smoker     Packs/day: 0.50     Types: Cigarettes     Last attempt to quit: 2018     Years since quittin.6     Smokeless tobacco: Never Used   Substance and Sexual Activity     Alcohol use: Yes     Alcohol/week: 0.0 oz     Comment: on occ     Drug use: No     Sexual activity: Yes     Partners: Male     Birth control/protection: Surgical   Lifestyle     Physical activity:     Days per week: Not on file     Minutes per session: Not on file     Stress: Not on file   Relationships     Social connections:     Talks on phone: Not on file     Gets together: Not on file     Attends Jewish service: Not on file     Active member of club or organization: Not on file     Attends meetings of clubs or organizations: Not on file     Relationship status: Not on file     Intimate partner violence:     Fear of current or ex partner: Not on file     Emotionally abused: Not on file     Physically abused: Not on file     Forced sexual activity: Not on file   Other Topics Concern     Parent/sibling w/ CABG, MI or angioplasty before 65F 55M? Not Asked   Social History Narrative     Not on file        FAMILY HISTORY:   Family History   Problem Relation Age of Onset     Schizophrenia Mother      Depression Mother      Schizophrenia Daughter      Asthma Daughter      Diabetes Maternal Uncle      Depression Brother      Breast Cancer Maternal Grandmother         x2     Chronic Obstructive Pulmonary Disease Maternal Grandfather      Depression Maternal Grandfather      Dementia Paternal Grandmother      Dementia Paternal Uncle      Breast Cancer Maternal Aunt         EXAM:Vitals: /80   Ht 1.651 m (5' 5\")   Wt 106.6 kg (235 lb)   BMI 39.11 kg/m    BMI= Body mass " index is 39.11 kg/m .    General appearance: Patient is alert and fully cooperative with history & exam.  No sign of distress is noted during the visit.     Psychiatric: Affect is pleasant & appropriate.  Patient appears motivated to improve health.     Respiratory: Breathing is regular & unlabored while sitting.     HEENT: Hearing is intact to spoken word.  Speech is clear.  No gross evidence of visual impairment that would impact ambulation.     Dermatologic: Skin is intact to both lower extremities without significant lesions, rash or abrasion.  No paronychia or evidence of soft tissue infection is noted.     Vascular: DP & PT pulses are intact & regular bilaterally.  No significant edema or varicosities noted.  CFT and skin temperature is normal to both lower extremities.     Neurologic: Lower extremity sensation is intact to light touch.  No evidence of weakness or contracture in the lower extremities.  No evidence of neuropathy.     Musculoskeletal: Patient is ambulatory without assistive device or brace.  Increased arch height.  Pain with palpation along the peroneal tendons bilaterally.     Radiographs:  ankle xrays - no fractures noted.      ASSESSMENT:    Acute bilateral ankle pain  Pes cavus  Peroneal tendinitis of both lower legs  History of sprain of both ankles     PLAN:  Reviewed patient[facility] in [facility]. Reviewed xrays. Reviewed and discussed causes of tendonitis.  We discussed treatments such as immobiliation, icing, stretching, heel lifts, orthotics, physical therapy, MRI.     Talked about ankle sprains and that they can take up to 12 weeks to heal. We discussed that there can be tearing of the ligament which may require surgical repair to help re-stabilize the ankle. Non surgically recommend an ankle brace, orthotics, and physical therapy to get range of motion and proprioception back.    Given chronic ankle pain and sprains, recommend MRI of the ankles to assess for peroneal tendon tears.  Recommend she wear the braces she got at her previous doctor visit. Will call with results. Recommend sports medicine or ortho for knee assessment as that is out of my scope of practice.        Chelle Villagran DPM, Podiatry/Foot and Ankle Surgery    Weight management plan: Patient was referred to their PCP to discuss a diet and exercise plan.

## 2019-07-24 NOTE — PATIENT INSTRUCTIONS
Thank you for choosing [facility]!    DR. GARDNER'S CLINIC SCHEDULE  MONDAY AM - [place] TUESDAY - [place] [place]   [address] [address] [place] [address] [place] [address]   [phone] / [phone] / [phone]       WEDNESDAY - [place] FRIDAY AM - [facility]   [address]   [place] [address] [place] [address]   [phone] / [phone]       FRIDAY PM - [place] SCHEDULE SURGERY: [phone]   [address] BILLING QUESTIONS: [phone]   [place] [address] AFTER HOURS: [phone] / [phone] APPOINTMENTS: [phone]     Consumer Price Line (CPL) [phone]       We will call with results      Please call to schedule your MRI/CT/Ultrasound/Arthrogram appointment.  The number is [phone].        TENDONITIS   Tendons are the strong fibrous portions ofmuscles that attach to bones and allow the muscle to move a joint when it contracts. Tendons are very strong because they have a lot of force exerted on them. Sometimes tendons can become painful because they have suffered an acute injury, in which too much force was exerted at one time, or an overuse injury, in which a normal force was exerted too frequently or over a prolonged period of time. As a result, there is damage to the tendon and its surrounding soft tissue structures and they become inflammed. Because tendons do not have a great blood supply, they do not heal rapidly and the inflammation can become chronic.   Conservative treatment for tendinitis involves rest and anti-inflammatory measures. Ice is applied 15 minutes 2-3 times daily. Anti-inflammatory medications called NSAIDs (ibuprofen, example) can be taken provided they are used with caution, as they can lead to internal bleeding and increase the risk ofstroke and heart attack. Sometimes topical nitroglycerin is prescribed to help  with pain. Often your doctor will use a special shoe or removable walking cast to immobilize the tendon, allowing it to heal without further damage from use. These devices are very useful in helping tendons heal, but they may slow you down or make you feel like your hip, knee, or back are out ofalignment. This is temporary and should go away once you are out ofthe immobilization. You should not use a walking cast when showering or driving. Another option is Platelet Rich Plasma injections. (Normally done with a Sports and Orthorapedic doctor.   If conservative measures fail, your physician may need to surgically repair the tendon by removing any chronic inflammatory tissue and sewing it back together. Sometimes it is sewn to an adjacent tendon with similar function for support and sometimes it is lengthened. . Sometimes the bones around the tendon need to be realigned or reshaped to better support the tendon or prevent further damage. Your foot and ankle surgeon will discuss the specifics of your surgery with you, should you need it.    Towel stretch: Sit on a hard surface with your injured leg stretched out in front of you. Loop a towel around your toes and the ball of your foot and pull the towel toward your body keeping your leg straight. Hold this position for 15 to 30 seconds and then relax. Repeat 3 times. Then push the towel away with the ball of your foot. Repeat 3 times.  When you don't feel much of a stretch using the towel, you can start the standing calf stretch and the following exercises.  Standing calf stretch: Stand facing a wall with your hands on the wall at about eye level. Keep your injured leg back with your heel on the floor. Keep the other leg forward with the knee bent. Turn your back foot slightly inward (as if you were pigeon-toed). Slowly lean into the wall until you feel a stretch in the back of your calf. Hold the stretch for 15 to 30 seconds. Return to the starting position. Repeat 3  times. Do this exercise several times each day.   Standing soleus stretch: Stand facing a wall with your hands on the wall at about chest height. Keep your injured leg back with your heel on the floor. Keep the other leg forward with the knee bent. Turn your back foot slightly inward (as if you were pigeon-toed). Bend your back knee slightly and gently lean into the wall until you feel a stretch in the lower calf of your injured leg. Hold the stretch for 15 to 30 seconds. Return to the starting position. Repeat 3 times.   Achilles stretch: Stand with the ball of one foot on a stair. Reach for the step below with your heel until you feel a stretch in the arch of your foot. Hold this position for 15 to 30 seconds and then relax. Repeat 3 times.   Heel raise: Balance yourself while standing behind a chair or counter. Using the chair or counter as a support to help you, raise your body up onto your toes and hold for 5 seconds. Then slowly lower yourself down without holding onto the support. (It's OK to keep holding onto the support if you need to.) When this exercise becomes less painful, try lowering yourself down on the injured leg only. Repeat 15 times. Do 2 sets of 15. Rest 30 seconds between sets.   Step-up: Stand with the foot of your injured leg on a support 3 to 5 inches high (like a small step or block of wood). Keep your other foot flat on the floor. Shift your weight onto the injured leg on the support. Straighten your injured leg as the other leg comes off the floor. Return to the starting position by bending your injured leg and slowly lowering your uninjured leg back to the floor. Do 2 sets of 15.   Resisted ankle eversion: Sit with both legs stretched out in front of you, with your feet about a shoulder's width apart. Tie a loop in one end of elastic tubing. Put the foot of your injured leg through the loop so that the tubing goes around the arch of that foot and wraps around the outside of the other  foot. Hold onto the other end of the tubing with your hand to provide tension. Turn the foot of your injured leg up and out. Make sure you keep your other foot still so that it will allow the tubing to stretch as you move the foot of your injured leg. Return to the starting position. Do 2 sets of 15.   Balance and reach exercises: Stand next to a chair with your injured leg farther from the chair. The chair will provide support if you need it. Stand on the foot of your injured leg and bend your knee slightly. Try to raise the arch of this foot while keeping your big toe on the floor. Keep your foot in this position. With the hand that is farther away from the chair, reach forward in front of you by bending at the waist. Avoid bending your knee any more as you do this. Repeat this 10 times. To make the exercise more challenging, reach farther in front of you. Do 2 sets of 10.  the same position as above. While keeping your arch height, reach the hand that is farther away from the chair across your body toward the chair. The farther you reach, the more challenging the exercise. Do 2 sets of 10.     Resisted ankle eversion: Sit with both legs stretched out in front of you, with your feet about a shoulder's width apart. Tie a loop in one end of elastic tubing. Put the foot of your injured leg through the loop so that the tubing goes around the arch of that foot and wraps around the outside of the other foot. Hold onto the other end of the tubing with your hand to provide tension. Turn the foot of your injured leg up and out. Make sure you keep your other foot still so that it will allow the tubing to stretch as you move the foot of your injured leg. Return to the starting position. Do 2 sets of 15.   If you have access to a wobble board, do the following exercises:  Wobble board exercises:   Stand on a wobble board with your feet shoulder width apart. Rock the board forwards and backwards 30 times, then side to  side 30 times. Hold on to a chair if you need support.   Rotate the wobble board around so that the edge of the board is in contact with the floor at all times. Do this 30 times in a clockwise and then a counterclockwise direction.   Balance on the wobble board for as long as you can without letting the edges touch the floor. Try to do this for 2 minutes without touching the floor.   Rotate the wobble board in clockwise and counterclockwise circles, but do not let the edge of the board touch the floor.   When you have mastered exercises A through D, try repeating them while standing on just your injured leg.   After you are able to do these exercises on one leg, try to do them with your eyes closed. Make sure you have something nearby to support you in case you lose your balance.    ANKLE SPRAIN  An ankle sprain is an injury to one or more ligaments in the ankle, usually on the outside of the ankle. Ligaments are bands of tissue - like rubber bands - that connect one bone to another and bind the joints together. In the ankle joint, ligaments provide stability by limiting side-to-side movement.  Some ankle sprains are much worse than others. The severity of an ankle sprain depends on whether the ligament is stretched, partially torn, or completely torn, as well as on the number of ligaments involved. Ankle sprains are not the same as strains, which affect muscles rather than ligaments.  CAUSES  Sprained ankles often result from a fall, a sudden twist, or a blow that forces the ankle joint out of its normal position. Ankle sprains commonly occur while participating in sports, wearing inappropriate shoes, or walking or running on an uneven surface.  Sometimes ankle sprains occur because of a person is born with weak ankles. Previous ankle or foot injuries can also weaken the ankle and lead to sprains.  SYMPTOMS  The symptoms of ankle sprains may include: pain or soreness, swelling, bruising, difficulty walking, and  stiffness in the joint.  These symptoms may vary in intensity, depending on the severity of the sprain. Sometimes pain and swelling are absent in people with previous ankle sprains. Instead, they may simply feel the ankle is wobbly and unsteady when they walk. Even if there is no pain or swelling with a sprained ankle, treatment is crucial. Any ankle sprain - whether it s your first or your fifth - requires prompt medical attention.  DIAGNOSIS  In evaluating your injury, the foot and ankle surgeon will obtain a thorough history of your symptoms and examine your foot. X-rays or other advanced imaging studies may be ordered to help determine the severity of the injury.  MEDICAL TREATMENT  There are four key reasons why an ankle sprain should be promptly evaluated and treated by a foot and ankle surgeon:  An untreated ankle sprain may lead to chronic ankle instability, a condition marked by persistent discomfort and a  giving way  of the ankle. Weakness in the leg may also develop.   A more severe ankle injury may have occurred along with the sprain. This might include a serious bone fracture that, if left untreated, could lead to troubling complications.   An ankle sprain may be accompanied by a foot injury that causes discomfort but has gone unnoticed thus far.   Rehabilitation of a sprained ankle needs to begin right away. If rehabilitation is delayed, the injury may be less likely to heal properly.   NON-SURGICAL TREATMENT  When you have an ankle sprain, rehabilitation is crucial--and it starts the moment your treatment begins. Your foot and ankle surgeon may recommend one or more of the following treatment options:  Rest. Stay off the injured ankle. Walking may cause further injury.   Ice. Apply an ice pack to the injured area, placing a thin towel between the ice and the skin. Use ice for 20 minutes and then wait at least 40 minutes before icing again.   Compression. An elastic wrap may be recommended to control  swelling.   Elevation. The ankle should be raised slightly above the level of your heart to reduce swelling.   Early physical therapy. Your doctor will start you on a rehabilitation program as soon as possible to promote healing and increase your range of motion. This includes doing prescribed exercises.   Medications. Nonsteroidal anti-inflammatory drugs (NSAIDs), such as ibuprofen, may be recommended to reduce pain and inflammation. In some cases, prescription pain medications are needed to provide adequate relief.   SURGICAL TREATMENT  In more severe cases, surgery may be required to adequately treat an ankle sprain. Surgery often involves repairing the damaged ligament or ligaments. The foot and ankle surgeon will select the surgical procedure best suited for your case based on the type and severity of your injury as well as your activity level.  After surgery, rehabilitation is extremely important. Completing your rehabilitation program is crucial to a successful outcome. Be sure to continue to see your foot and ankle surgeon during this period to ensure that your ankle heals properly and function is restored.        BODY WEIGHT AND YOUR FEET  The following information is included in the after visit summary for all patients. Body weight can be a sensitive issue to discuss in clinic, but we think the following information is very important. Although we focus on the feet and ankles, we do support the overall health of our patients.     Many things can cause foot and ankle problems. Foot structure, activity level, foot mechanics and injuries are common causes of pain. One very important issue that often goes unmentioned, is body weight. Extra weight can cause increased stress on muscles, ligaments, bones and tendons. Sometimes just a few extra pounds is all it takes to put one over her/his threshold. Without reducing that stress, it can be difficult to alleviate pain. As Foot & Ankle specialists, our job is  addressing the lower extremity problem and possible causes. Regarding extra body weight, we encourage patients to discuss diet and weight management plans with their primary care doctors. It is this team approach that gives you the best opportunity for pain relief and getting you back on your feet.      [facility] has a Comprehensive Weight Management Program. This program includes counseling, education, non-surgical and surgical approaches to weight loss. If you are interested in learning more either talk to you primary care provider or call [phone].

## 2019-07-24 NOTE — LETTER
"    7/24/2019         RE: Holland Gary  [address]  [place] [address]        Dear Colleague,    Thank you for referring your patient, Holland Gary, to the [facility]. Please see a copy of my visit note below.    PATIENT HISTORY:    Holland Gary is a 38 year old female who presents to clinic for assessment of ankles. Notes that she falls frequently.  States a week ago, her right ankle \"gave out\" and she fell on hands and knees.  Also her left ankle but doesn't remember hitting it on anything. Did have xrays on knees, ankles and feet. Ankles hurt on the outside of the ankle.     Review of Systems:  Patient denies fever, chills, rash, wound, stiffness,numbness, weakness, heart burn, blood in stool, chest pain with activity, calf pain when walking, shortness of breath with activity, chronic cough, easy bleeding/bruising, swelling of ankles, excessive thirst, fatigue, depression, anxiety.  Patient admits to limping.     PAST MEDICAL HISTORY:   Past Medical History:   Diagnosis Date     Trigeminal neuralgia         PAST SURGICAL HISTORY:   Past Surgical History:   Procedure Laterality Date     left wrist ORIF Left 1993     TONSILLECTOMY       TUBAL LIGATION          MEDICATIONS:   Current Outpatient Medications:      Amphetamine-Dextroamphetamine (ADDERALL PO), Take 10 mg by mouth daily, Disp: , Rfl:      Amphetamine-Dextroamphetamine (ADDERALL XR PO), Take 20 mg by mouth daily , Disp: , Rfl:      carBAMazepine (TEGRETOL) 200 MG tablet, Take 200 mg by mouth as needed , Disp: , Rfl:      multivitamin, therapeutic with minerals (MULTI-VITAMIN) TABS tablet, Take 1 tablet by mouth daily, Disp: , Rfl:      ZOLMitriptan (ZOMIG) 5 MG tablet, Take 1 tablet (5 mg) by mouth at onset of headache for migraine May repeat in 2 hours. Max 2 tablets/24 hours., Disp: 9 tablet, Rfl: 1     escitalopram (LEXAPRO) 10 MG tablet, Take 10 mg by mouth, Disp: , Rfl:      ALLERGIES:  No Known Allergies     SOCIAL HISTORY: "   Social History     Socioeconomic History     Marital status: Single     Spouse name: Not on file     Number of children: Not on file     Years of education: Not on file     Highest education level: Not on file   Occupational History     Not on file   Social Needs     Financial resource strain: Not on file     Food insecurity:     Worry: Not on file     Inability: Not on file     Transportation needs:     Medical: Not on file     Non-medical: Not on file   Tobacco Use     Smoking status: Former Smoker     Packs/day: 0.50     Types: Cigarettes     Last attempt to quit: 2018     Years since quittin.6     Smokeless tobacco: Never Used   Substance and Sexual Activity     Alcohol use: Yes     Alcohol/week: 0.0 oz     Comment: on occ     Drug use: No     Sexual activity: Yes     Partners: Male     Birth control/protection: Surgical   Lifestyle     Physical activity:     Days per week: Not on file     Minutes per session: Not on file     Stress: Not on file   Relationships     Social connections:     Talks on phone: Not on file     Gets together: Not on file     Attends [employer] service: Not on file     Active member of club or organization: Not on file     Attends meetings of clubs or organizations: Not on file     Relationship status: Not on file     Intimate partner violence:     Fear of current or ex partner: Not on file     Emotionally abused: Not on file     Physically abused: Not on file     Forced sexual activity: Not on file   Other Topics Concern     Parent/sibling w/ CABG, MI or angioplasty before 65F 55M? Not Asked   Social History Narrative     Not on file        FAMILY HISTORY:   Family History   Problem Relation Age of Onset     Schizophrenia Mother      Depression Mother      Schizophrenia Daughter      Asthma Daughter      Diabetes Maternal Uncle      Depression Brother      Breast Cancer Maternal Grandmother         x2     Chronic Obstructive Pulmonary Disease Maternal Grandfather       "Depression Maternal Grandfather      Dementia Paternal Grandmother      Dementia Paternal Uncle      Breast Cancer Maternal Aunt         EXAM:Vitals: /80   Ht 1.651 m (5' 5\")   Wt 106.6 kg (235 lb)   BMI 39.11 kg/m     BMI= Body mass index is 39.11 kg/m .    General appearance: Patient is alert and fully cooperative with history & exam.  No sign of distress is noted during the visit.     Psychiatric: Affect is pleasant & appropriate.  Patient appears motivated to improve health.     Respiratory: Breathing is regular & unlabored while sitting.     HEENT: Hearing is intact to spoken word.  Speech is clear.  No gross evidence of visual impairment that would impact ambulation.     Dermatologic: Skin is intact to both lower extremities without significant lesions, rash or abrasion.  No paronychia or evidence of soft tissue infection is noted.     Vascular: DP & PT pulses are intact & regular bilaterally.  No significant edema or varicosities noted.  CFT and skin temperature is normal to both lower extremities.     Neurologic: Lower extremity sensation is intact to light touch.  No evidence of weakness or contracture in the lower extremities.  No evidence of neuropathy.     Musculoskeletal: Patient is ambulatory without assistive device or brace.  Increased arch height.  Pain with palpation along the peroneal tendons bilaterally.     Radiographs:  ankle xrays - no fractures noted.      ASSESSMENT:    Acute bilateral ankle pain  Pes cavus  Peroneal tendinitis of both lower legs  History of sprain of both ankles     PLAN:  Reviewed patient's chart in [facility]. Reviewed xrays. Reviewed and discussed causes of tendonitis.  We discussed treatments such as immobiliation, icing, stretching, heel lifts, orthotics, physical therapy, MRI.     Talked about ankle sprains and that they can take up to 12 weeks to heal. We discussed that there can be tearing of the ligament which may require surgical repair to help re-stabilize the " ankle. Non surgically recommend an ankle brace, orthotics, and physical therapy to get range of motion and proprioception back.    Given chronic ankle pain and sprains, recommend MRI of the ankles to assess for peroneal tendon tears. Recommend she wear the braces she got at her previous doctor visit. Will call with results. Recommend sports medicine or ortho for knee assessment as that is out of my scope of practice.        Chelle Villagran DPM, Podiatry/Foot and Ankle Surgery    Weight management plan: Patient was referred to their PCP to discuss a diet and exercise plan.      Again, thank you for allowing me to participate in the care of your patient.        Sincerely,        Chelle Villagran DPM, Podiatry/Foot and Ankle Surgery

## 2019-08-20 ENCOUNTER — HOSPITAL ENCOUNTER (EMERGENCY)
Facility: CLINIC | Age: 39
Discharge: HOME | End: 2019-08-20
Attending: EMERGENCY MEDICINE | Admitting: EMERGENCY MEDICINE
Payer: COMMERCIAL

## 2019-08-20 ENCOUNTER — APPOINTMENT (OUTPATIENT)
Dept: ULTRASOUND IMAGING | Facility: CLINIC | Age: 39
End: 2019-08-20
Attending: EMERGENCY MEDICINE
Payer: COMMERCIAL

## 2019-08-20 ENCOUNTER — APPOINTMENT (OUTPATIENT)
Dept: GENERAL RADIOLOGY | Facility: CLINIC | Age: 39
End: 2019-08-20
Attending: EMERGENCY MEDICINE
Payer: COMMERCIAL

## 2019-08-20 VITALS
RESPIRATION RATE: 18 BRPM | SYSTOLIC BLOOD PRESSURE: 120 MMHG | TEMPERATURE: 98.3 F | WEIGHT: 235 LBS | OXYGEN SATURATION: 97 % | DIASTOLIC BLOOD PRESSURE: 74 MMHG | BODY MASS INDEX: 39.11 KG/M2

## 2019-08-20 DIAGNOSIS — R07.9 ACUTE CHEST PAIN: ICD-10-CM

## 2019-08-20 LAB
ANION GAP SERPL CALCULATED.3IONS-SCNC: 6 MMOL/L (ref 3–14)
BASOPHILS # BLD AUTO: 0 10E9/L (ref 0–0.2)
BASOPHILS NFR BLD AUTO: 0.3 %
BUN SERPL-MCNC: 15 MG/DL (ref 7–30)
CALCIUM SERPL-MCNC: 8.8 MG/DL (ref 8.5–10.1)
CHLORIDE SERPL-SCNC: 105 MMOL/L (ref 94–109)
CO2 SERPL-SCNC: 28 MMOL/L (ref 20–32)
CREAT SERPL-MCNC: 0.77 MG/DL (ref 0.52–1.04)
D DIMER PPP FEU-MCNC: 0.4 UG/ML FEU (ref 0–0.5)
DIFFERENTIAL METHOD BLD: ABNORMAL
EOSINOPHIL # BLD AUTO: 0.1 10E9/L (ref 0–0.7)
EOSINOPHIL NFR BLD AUTO: 0.7 %
ERYTHROCYTE [DISTWIDTH] IN BLOOD BY AUTOMATED COUNT: 13.6 % (ref 10–15)
GFR SERPL CREATININE-BSD FRML MDRD: >90 ML/MIN/{1.73_M2}
GLUCOSE SERPL-MCNC: 91 MG/DL (ref 70–99)
HCT VFR BLD AUTO: 40.2 % (ref 35–47)
HGB BLD-MCNC: 13.1 G/DL (ref 11.7–15.7)
IMM GRANULOCYTES # BLD: 0 10E9/L (ref 0–0.4)
IMM GRANULOCYTES NFR BLD: 0.3 %
LYMPHOCYTES # BLD AUTO: 3.3 10E9/L (ref 0.8–5.3)
LYMPHOCYTES NFR BLD AUTO: 28.4 %
MCH RBC QN AUTO: 28.7 PG (ref 26.5–33)
MCHC RBC AUTO-ENTMCNC: 32.6 G/DL (ref 31.5–36.5)
MCV RBC AUTO: 88 FL (ref 78–100)
MONOCYTES # BLD AUTO: 0.6 10E9/L (ref 0–1.3)
MONOCYTES NFR BLD AUTO: 5.1 %
NEUTROPHILS # BLD AUTO: 7.6 10E9/L (ref 1.6–8.3)
NEUTROPHILS NFR BLD AUTO: 65.2 %
NRBC # BLD AUTO: 0 10*3/UL
NRBC BLD AUTO-RTO: 0 /100
PLATELET # BLD AUTO: 339 10E9/L (ref 150–450)
POTASSIUM SERPL-SCNC: 3.7 MMOL/L (ref 3.4–5.3)
RBC # BLD AUTO: 4.57 10E12/L (ref 3.8–5.2)
SODIUM SERPL-SCNC: 139 MMOL/L (ref 133–144)
TROPONIN I SERPL-MCNC: <0.015 UG/L (ref 0–0.04)
WBC # BLD AUTO: 11.6 10E9/L (ref 4–11)

## 2019-08-20 PROCEDURE — 25000125 ZZHC RX 250: Performed by: EMERGENCY MEDICINE

## 2019-08-20 PROCEDURE — 25000132 ZZH RX MED GY IP 250 OP 250 PS 637: Performed by: EMERGENCY MEDICINE

## 2019-08-20 PROCEDURE — 71046 X-RAY EXAM CHEST 2 VIEWS: CPT

## 2019-08-20 PROCEDURE — 99285 EMERGENCY DEPT VISIT HI MDM: CPT | Mod: 25

## 2019-08-20 PROCEDURE — 25000128 H RX IP 250 OP 636: Performed by: EMERGENCY MEDICINE

## 2019-08-20 PROCEDURE — 85379 FIBRIN DEGRADATION QUANT: CPT | Performed by: EMERGENCY MEDICINE

## 2019-08-20 PROCEDURE — 84484 ASSAY OF TROPONIN QUANT: CPT | Performed by: EMERGENCY MEDICINE

## 2019-08-20 PROCEDURE — 96374 THER/PROPH/DIAG INJ IV PUSH: CPT

## 2019-08-20 PROCEDURE — 80048 BASIC METABOLIC PNL TOTAL CA: CPT | Performed by: EMERGENCY MEDICINE

## 2019-08-20 PROCEDURE — 93970 EXTREMITY STUDY: CPT

## 2019-08-20 PROCEDURE — 85025 COMPLETE CBC W/AUTO DIFF WBC: CPT | Performed by: EMERGENCY MEDICINE

## 2019-08-20 PROCEDURE — 93005 ELECTROCARDIOGRAM TRACING: CPT

## 2019-08-20 RX ORDER — KETOROLAC TROMETHAMINE 15 MG/ML
15 INJECTION, SOLUTION INTRAMUSCULAR; INTRAVENOUS ONCE
Status: COMPLETED | OUTPATIENT
Start: 2019-08-20 | End: 2019-08-20

## 2019-08-20 RX ADMIN — KETOROLAC TROMETHAMINE 15 MG: 15 INJECTION, SOLUTION INTRAMUSCULAR; INTRAVENOUS at 19:15

## 2019-08-20 RX ADMIN — LIDOCAINE HYDROCHLORIDE 30 ML: 20 SOLUTION ORAL; TOPICAL at 19:15

## 2019-08-20 ASSESSMENT — ENCOUNTER SYMPTOMS
FEVER: 0
CHILLS: 0
NAUSEA: 0
RHINORRHEA: 0

## 2019-08-20 NOTE — ED TRIAGE NOTES
Pt presents to ED due to chest pain.  States she has been having chest pain for few days, but developed actual pain today.  ABCs intact

## 2019-08-20 NOTE — ED AVS SNAPSHOT
[facility] Emergency Department  [address]  [facility] [address]  Phone:  [phone]  Fax:  [phone]                                    Holland Gary   MRN: [id number]    Department:  [facility] Emergency Department   Date of Visit:  8/20/2019           After Visit Summary Signature Page    I have received my discharge instructions, and my questions have been answered. I have discussed any challenges I see with this plan with the nurse or doctor.    ..........................................................................................................................................  Patient/Patient Representative Signature      ..........................................................................................................................................  Patient Representative Print Name and Relationship to Patient    ..................................................               ................................................  Date                                   Time    ..........................................................................................................................................  Reviewed by Signature/Title    ...................................................              ..............................................  Date                                               Time          22EPIC Rev 08/18

## 2019-08-21 LAB — INTERPRETATION ECG - MUSE: NORMAL

## 2019-08-21 NOTE — ED PROVIDER NOTES
History     Chief Complaint:    Chest Pain      HPI   Holland Gary is a 38 year old female who presents to the emergency department today with chest pain. The patient states that she has had intermittent chest pressure for the last few weeks, but today she experienced chest tightness that was more sever around 0800 while sitting at work. She states additionally she had a fall that caused some leg swelling with pain up the back of the leg. She denies nausea, fever, cough, chills, rhinorrhea, recent travel, rash, birth control, or chance of pregnancy. She states she had a heart murmur at birth and it was also found when she gave birth to her kids. She is unaware of any family history of blood clots or heart attacks.     Allergies:  No Known Allergies     Medications:    Adderall  Tegretol  Zomig  Lexapro    Past Medical History:     Migraine   Obesity  Trigeminal neuralgia  Attention deficit disorder (ADD)   Major depressive disorder     Past Surgical History:    ORIF left wrist  Tonsillectomy  Tubal ligation   [place] teeth    Family History:    Mother - schizophrenia, depression  Daughter - schizophrenia, asthma  Bother - depression    Social History:  The patient was accompanied to the ED alone.  Smoking Status: Former  Smokeless Tobacco: Never  Alcohol Use: Yes      Marital Status:  Single     Review of Systems   Constitutional: Negative for chills and fever.   HENT: Negative for rhinorrhea.    Cardiovascular: Positive for chest pain and leg swelling (due to a fall).   Gastrointestinal: Negative for nausea.   Skin: Positive for rash.   All other systems reviewed and are negative.        Physical Exam   First Vitals:  BP: (!) 135/95  Heart Rate: 98  Temp: 98.3  F (36.8  C)  Resp: 18  Weight: 106.6 kg (235 lb)  SpO2: 100 %      Physical Exam  Gen: well appearing, in no acute distress  HEENT:  mmm, no rhinorrhea  Neck: supple, no abnormal swelling  Lungs:  CTAB,  no resp distress  CV: rrr, no m/r/g, ppi  Abd:  soft, nontender, nondistended, no rebound/masses/guarding/hsm  Ext: no peripheral edema  Skin: warm, dry, well perfused, no rashes/bruising/lesions on exposed skin  Neuro: alert, MAEE, no gross motor or sensory deficits, gait stable  Psych: Normal mood, normal affect      Emergency Department Course   ECG:  Indication: chest pain  Time: 1808  Vent. Rate 81 bpm. AL interval 168. QRS duration 78. QT/QTc 372/432. P-R-T axis 41 36 45.  Normal sinus and rhythm. Cannot rule out anterior infarct, age undetermined. Abnormal ECG.   Read time: 1811    Imaging:  Radiographic findings were communicated with the patient who voiced understanding of the findings.    XR Chest 2 views:   FINDINGS: Negative chest, as per radiology.    US Lower Extremity Venous Duplex, bilateral:   CONCLUSION:  1.  Bilateral leg veins are negative for DVT, as per radiology.    Laboratory:  D-dimer: 0.4  1854 Troponin: <0.015  BMP: Glucose: 91, o/w WNL (Creatinine: 0.77)  CBC: WBC: 11.6 (H), HGB: 13.1, PLT: 339    Interventions:  1915 Toradol 15 mg IV  1915 Xylocaine 30 mL PO    Emergency Department Course:  Nursing notes and vitals reviewed. (1905) I performed an exam of the patient as documented above.     IV inserted. Medicine administered as documented above. Blood drawn. This was sent to the lab for further testing, results above.     The patient was sent for a lower extremity US and CXR while in the emergency department, findings above.     2028 I rechecked the patient and discussed the results of his workup thus far.     Findings and plan explained to the Patient. Patient discharged home with instructions regarding supportive care, medications, and reasons to return. The importance of close follow-up was reviewed.     I personally reviewed the laboratory results with the Patient and answered all related questions prior to discharge.      Impression & Plan      Medical Decision Making:  \38-year-old female presenting with intermittent chest pain  over the last few weeks and constant since 8 AM this morning.  No concerning cardiopulmonary etiology was identified here in the emergency department by EKG troponin d-dimer chest x-ray ultrasound of her lower extremities.  Etiology unclear but I think she is young and healthy with reassuring work-up here she can safely be discharged home to declare herself clinically return if new or worsening symptoms and follow-up with her PCP in a few days.  May be musculoskeletal versus esophageal spasm versus GERD etc.  We discussed what is known and unknown based on the testing done here in the emergency department I think she is low enough risk to be discharged home and declare herself clinically of which she was comfortable and agreeable with.      Diagnosis:    ICD-10-CM    1. Acute chest pain R07.9        Disposition:  discharged to home   Scribe Disclosure:  I, Dean Ferris, am serving as a scribe at 7:22 PM on 8/20/2019 to document services personally performed by Scottie Streeter, based on my observations and the provider's statements to me.     8/20/2019   [facility] EMERGENCY DEPARTMENT       Scottie Streeter MD  08/21/19 0151

## 2019-09-12 ENCOUNTER — OFFICE VISIT (OUTPATIENT)
Dept: URGENT CARE | Facility: URGENT CARE | Age: 39
End: 2019-09-12
Payer: COMMERCIAL

## 2019-09-12 VITALS
SYSTOLIC BLOOD PRESSURE: 98 MMHG | DIASTOLIC BLOOD PRESSURE: 68 MMHG | HEART RATE: 85 BPM | TEMPERATURE: 98.7 F | WEIGHT: 240 LBS | OXYGEN SATURATION: 97 % | BODY MASS INDEX: 39.94 KG/M2

## 2019-09-12 DIAGNOSIS — E55.9 VITAMIN D DEFICIENCY: ICD-10-CM

## 2019-09-12 DIAGNOSIS — R53.83 FATIGUE, UNSPECIFIED TYPE: Primary | ICD-10-CM

## 2019-09-12 DIAGNOSIS — M79.10 MYALGIA: ICD-10-CM

## 2019-09-12 LAB
ERYTHROCYTE [DISTWIDTH] IN BLOOD BY AUTOMATED COUNT: 13.8 % (ref 10–15)
HCT VFR BLD AUTO: 41.3 % (ref 35–47)
HETEROPH AB SER QL: NEGATIVE
HGB BLD-MCNC: 13.3 G/DL (ref 11.7–15.7)
MCH RBC QN AUTO: 28.7 PG (ref 26.5–33)
MCHC RBC AUTO-ENTMCNC: 32.2 G/DL (ref 31.5–36.5)
MCV RBC AUTO: 89 FL (ref 78–100)
PLATELET # BLD AUTO: 356 10E9/L (ref 150–450)
RBC # BLD AUTO: 4.64 10E12/L (ref 3.8–5.2)
WBC # BLD AUTO: 9.9 10E9/L (ref 4–11)

## 2019-09-12 PROCEDURE — 99214 OFFICE O/P EST MOD 30 MIN: CPT | Performed by: FAMILY MEDICINE

## 2019-09-12 PROCEDURE — 82550 ASSAY OF CK (CPK): CPT | Performed by: FAMILY MEDICINE

## 2019-09-12 PROCEDURE — 82306 VITAMIN D 25 HYDROXY: CPT | Performed by: FAMILY MEDICINE

## 2019-09-12 PROCEDURE — 85027 COMPLETE CBC AUTOMATED: CPT | Performed by: FAMILY MEDICINE

## 2019-09-12 PROCEDURE — 83735 ASSAY OF MAGNESIUM: CPT | Performed by: FAMILY MEDICINE

## 2019-09-12 PROCEDURE — 80053 COMPREHEN METABOLIC PANEL: CPT | Performed by: FAMILY MEDICINE

## 2019-09-12 PROCEDURE — 36415 COLL VENOUS BLD VENIPUNCTURE: CPT | Performed by: FAMILY MEDICINE

## 2019-09-12 PROCEDURE — 86308 HETEROPHILE ANTIBODY SCREEN: CPT | Performed by: FAMILY MEDICINE

## 2019-09-12 NOTE — LETTER
September 12, 2019      Holland Gary  [address]  [place] [address]        To Whom It May Concern:    Holland Gary  was seen on 9/12.  Please excuse her from work 9/11 - 9/13 due to illness.        Sincerely,        Jorje Barlow MD

## 2019-09-13 LAB
ALBUMIN SERPL-MCNC: 3.5 G/DL (ref 3.4–5)
ALP SERPL-CCNC: 71 U/L (ref 40–150)
ALT SERPL W P-5'-P-CCNC: 19 U/L (ref 0–50)
ANION GAP SERPL CALCULATED.3IONS-SCNC: 7 MMOL/L (ref 3–14)
AST SERPL W P-5'-P-CCNC: 13 U/L (ref 0–45)
BILIRUB SERPL-MCNC: 0.2 MG/DL (ref 0.2–1.3)
BUN SERPL-MCNC: 13 MG/DL (ref 7–30)
CALCIUM SERPL-MCNC: 8.9 MG/DL (ref 8.5–10.1)
CHLORIDE SERPL-SCNC: 106 MMOL/L (ref 94–109)
CK SERPL-CCNC: 29 U/L (ref 30–225)
CO2 SERPL-SCNC: 25 MMOL/L (ref 20–32)
CREAT SERPL-MCNC: 0.77 MG/DL (ref 0.52–1.04)
GFR SERPL CREATININE-BSD FRML MDRD: >90 ML/MIN/{1.73_M2}
GLUCOSE SERPL-MCNC: 106 MG/DL (ref 70–99)
MAGNESIUM SERPL-MCNC: 2.1 MG/DL (ref 1.6–2.3)
POTASSIUM SERPL-SCNC: 4.3 MMOL/L (ref 3.4–5.3)
PROT SERPL-MCNC: 7.4 G/DL (ref 6.8–8.8)
SODIUM SERPL-SCNC: 138 MMOL/L (ref 133–144)

## 2019-09-13 NOTE — PROGRESS NOTES
SUBJECTIVE  HPI:  Holland Gary is a 38 year old female who presents with the CC of abdominal pain.    Pain is located in the LUQ area, with radiation to None.  Positive for mono with children's friends and patient is wondering if she also has mono.  Patient states that has been more fatigue.  Pain in left side of abdomen has been present for the past 1 month.  BM normal.  Denies any dysuria or frequency.  Denies any fever.  Endorsed more body aches and leg discomfort.  Patient has had low vitamin D in the past, has not been taking her vit d supplementation    Past Medical History:   Diagnosis Date     Trigeminal neuralgia      Current Outpatient Medications   Medication Sig Dispense Refill     Amphetamine-Dextroamphetamine (ADDERALL PO) Take 10 mg by mouth daily       Amphetamine-Dextroamphetamine (ADDERALL XR PO) Take 20 mg by mouth daily        carBAMazepine (TEGRETOL) 200 MG tablet Take 200 mg by mouth as needed        multivitamin, therapeutic with minerals (MULTI-VITAMIN) TABS tablet Take 1 tablet by mouth daily       ZOLMitriptan (ZOMIG) 5 MG tablet Take 1 tablet (5 mg) by mouth at onset of headache for migraine May repeat in 2 hours. Max 2 tablets/24 hours. 9 tablet 1     Social History     Tobacco Use     Smoking status: Former Smoker     Packs/day: 0.50     Types: Cigarettes     Last attempt to quit: 2018     Years since quittin.8     Smokeless tobacco: Never Used   Substance Use Topics     Alcohol use: Yes     Alcohol/week: 0.0 oz     Comment: on occ       ROS:  Review of systems negative except as stated above.    OBJECTIVE:  BP 98/68   Pulse 85   Temp 98.7  F (37.1  C) (Oral)   Wt 108.9 kg (240 lb)   SpO2 97%   BMI 39.94 kg/m    GENERAL APPEARANCE: healthy, alert and no distress  PSYCH: mentation appears normal and affect flat    Results for orders placed or performed in visit on 19   CBC with platelets   Result Value Ref Range    WBC 9.9 4.0 - 11.0 10e9/L    RBC Count 4.64 3.8 -  5.2 10e12/L    Hemoglobin 13.3 11.7 - 15.7 g/dL    Hematocrit 41.3 35.0 - 47.0 %    MCV 89 78 - 100 fl    MCH 28.7 26.5 - 33.0 pg    MCHC 32.2 31.5 - 36.5 g/dL    RDW 13.8 10.0 - 15.0 %    Platelet Count 356 150 - 450 10e9/L   Comprehensive metabolic panel (BMP + Alb, Alk Phos, ALT, AST, Total. Bili, TP)   Result Value Ref Range    Sodium 138 133 - 144 mmol/L    Potassium 4.3 3.4 - 5.3 mmol/L    Chloride 106 94 - 109 mmol/L    Carbon Dioxide 25 20 - 32 mmol/L    Anion Gap 7 3 - 14 mmol/L    Glucose 106 (H) 70 - 99 mg/dL    Urea Nitrogen 13 7 - 30 mg/dL    Creatinine 0.77 0.52 - 1.04 mg/dL    GFR Estimate >90 >60 mL/min/[1.73_m2]    GFR Estimate If Black >90 >60 mL/min/[1.73_m2]    Calcium 8.9 8.5 - 10.1 mg/dL    Bilirubin Total 0.2 0.2 - 1.3 mg/dL    Albumin 3.5 3.4 - 5.0 g/dL    Protein Total 7.4 6.8 - 8.8 g/dL    Alkaline Phosphatase 71 40 - 150 U/L    ALT 19 0 - 50 U/L    AST 13 0 - 45 U/L   Mononucleosis screen   Result Value Ref Range    Mononucleosis Screen Negative NEG^Negative   Magnesium   Result Value Ref Range    Magnesium 2.1 1.6 - 2.3 mg/dL   CK total   Result Value Ref Range    CK Total 29 (L) 30 - 225 U/L       ASSESSMENT/PLAN:  (R53.83) Fatigue, unspecified type  (primary encounter diagnosis)  Plan: CBC with platelets, Comprehensive metabolic         panel (BMP + Alb, Alk Phos, ALT, AST, Total.         Bili, TP), Mononucleosis screen            (E55.9) Vitamin D deficiency  Plan: Vitamin D Deficiency            (M79.10) Myalgia  Plan: CBC with platelets, Comprehensive metabolic         panel (BMP + Alb, Alk Phos, ALT, AST, Total.         Bili, TP), Mononucleosis screen, Magnesium, CK         total            Reassurance given, reviewed symptomatic treatment with tylenol, ibuprofen, plenty of fluids and rest.  Discussed mono and that fatigue can last for couple of months, avoid contact sports for a month and longer is still having abdominal symptoms.  Discussed that if any acute worsening of abdominal  pain that needs to be seen in ER.    Reviewed that due to fatigue and body aches, will assess for CK enzymes, metabolic panel and vit D as potential sources for symptoms.    Follow up primary provider if no improvement of symptoms in 1-2 weeks    Jorje Barlow MD

## 2019-09-13 NOTE — PATIENT INSTRUCTIONS
Rest, drink lots of fluids and monitor symptoms.      Patient Education     Mononucleosis  Mononucleosis (also called mono) is a contagious viral infection. Most infants and children exposed to the virus get only mild flu-like symptoms or no symptoms at all. However, infection is usually more serious in teens and young adults. While the virus is active, it causes symptoms and can spread to others. After symptoms subside, the virus stays in the body and eventually becomes inactive. The virus can reactivate and develop symptoms, especially in people with weak immune systems.  The virus is usually spread by contact with saliva, often by kissing, or sharing food or eating utensils. It may also spread by breastmilk, blood, or sexual contact. It takes about 4 to 6 weeks to develop symptoms after exposure.  Early symptoms include headache, nausea, tiredness and general muscle aching. This is followed by sore throat and fever. Lymph glands in the neck, under the arms, or in the groin may be swollen. Symptoms usually go away in about 1 to 2 months. But they can last up to 4 months.  In the first few days to weeks, a common blood test (the monospot test) us ed to diagnose this disease may be negative even though you have the illness. In this case, other tests may be done.  Taking the antibiotics ampicillin or amoxicillin during a mono infection may cause a skin rash. This is not serious and will fade in about a week. The rash may not mean you are having an allergic reaction to the antibiotic.  Mono can cause your spleen to swell. The spleen is a fist-sized organ in the upper left abdomen that stores red blood cells. Injury to a swollen spleen can cause the spleen to rupture. This can cause life-threatening internal bleeding. To prevent this from happening, don't play contact sports or do strenuous activity for 8 weeks, or until your healthcare provider says it's OK. A sharp blow or pressure to the area could rupture a swollen  [facility] [place]    Rest in bed until the fever and weakness have gone away.    Drink plenty of fluids, but don't drink alcohol. Otherwise, you may eat a regular diet.    Ask your healthcare provider about using over-the-counter medicines to treat symptoms such as fever, pain, or an itchy rash.    Over-the-counter throat lozenges may help soothe a sore throat. Gargling with warm salt water (1/2 teaspoon in 1 glass of warm water) may also be soothing to the throat.    You may return to work or school after the fever goes away and you are feeling better. Continue to follow any activity restrictions you have been given.  Preventing spread of the virus  To limit the spread of the virus, don't expose others to your saliva for at least 6 months after your illness (no kissing or sharing utensils, drinking glasses, or toothbrushes).  Follow-up care  Follow up with your healthcare provider within 1 to 2 weeks, or as advised to be sure that there are no complications. If symptoms of extreme fatigue and swollen glands last longer than 6 months, see your healthcare provider for further testing.  When to seek medical advice  Call your healthcare provider right away if any of the following occur:    Excessive coughing    Yellow skin or eyes    Trouble swallowing    Dizziness    Paleness  Call 911  Call 911 if any of the following occur:    Severe or worsening abdominal pain    Trouble breathing  Date Last Reviewed: 3/1/2018    6459-6004 [employer]. [address] [place] [address], [place] [address]. All rights reserved. This information is not intended as a substitute for professional medical care. Always follow your healthcare professional's instructions.

## 2019-09-18 LAB — DEPRECATED CALCIDIOL+CALCIFEROL SERPL-MC: 23 UG/L (ref 20–75)

## 2019-10-02 ENCOUNTER — HEALTH MAINTENANCE LETTER (OUTPATIENT)
Age: 39
End: 2019-10-02

## 2019-10-07 ENCOUNTER — OFFICE VISIT (OUTPATIENT)
Dept: PEDIATRICS | Facility: CLINIC | Age: 39
End: 2019-10-07
Payer: COMMERCIAL

## 2019-10-07 VITALS
WEIGHT: 246 LBS | TEMPERATURE: 98 F | HEART RATE: 74 BPM | BODY MASS INDEX: 40.94 KG/M2 | OXYGEN SATURATION: 96 % | DIASTOLIC BLOOD PRESSURE: 76 MMHG | SYSTOLIC BLOOD PRESSURE: 120 MMHG

## 2019-10-07 DIAGNOSIS — R10.12 LUQ ABDOMINAL PAIN: ICD-10-CM

## 2019-10-07 DIAGNOSIS — R53.83 FATIGUE, UNSPECIFIED TYPE: Primary | ICD-10-CM

## 2019-10-07 LAB
ALBUMIN SERPL-MCNC: 3.6 G/DL (ref 3.4–5)
ALP SERPL-CCNC: 79 U/L (ref 40–150)
ALT SERPL W P-5'-P-CCNC: 40 U/L (ref 0–50)
AMYLASE SERPL-CCNC: 19 U/L (ref 30–110)
ANION GAP SERPL CALCULATED.3IONS-SCNC: 4 MMOL/L (ref 3–14)
AST SERPL W P-5'-P-CCNC: 19 U/L (ref 0–45)
BASOPHILS # BLD AUTO: 0 10E9/L (ref 0–0.2)
BASOPHILS NFR BLD AUTO: 0.4 %
BILIRUB SERPL-MCNC: 0.3 MG/DL (ref 0.2–1.3)
BUN SERPL-MCNC: 8 MG/DL (ref 7–30)
CALCIUM SERPL-MCNC: 8.9 MG/DL (ref 8.5–10.1)
CHLORIDE SERPL-SCNC: 103 MMOL/L (ref 94–109)
CO2 SERPL-SCNC: 31 MMOL/L (ref 20–32)
CREAT SERPL-MCNC: 0.69 MG/DL (ref 0.52–1.04)
CRP SERPL-MCNC: 12 MG/L (ref 0–8)
DIFFERENTIAL METHOD BLD: NORMAL
EOSINOPHIL # BLD AUTO: 0.1 10E9/L (ref 0–0.7)
EOSINOPHIL NFR BLD AUTO: 1.1 %
ERYTHROCYTE [DISTWIDTH] IN BLOOD BY AUTOMATED COUNT: 13.6 % (ref 10–15)
GFR SERPL CREATININE-BSD FRML MDRD: >90 ML/MIN/{1.73_M2}
GLUCOSE SERPL-MCNC: 93 MG/DL (ref 70–99)
HCT VFR BLD AUTO: 39.7 % (ref 35–47)
HGB BLD-MCNC: 12.7 G/DL (ref 11.7–15.7)
LIPASE SERPL-CCNC: 92 U/L (ref 73–393)
LYMPHOCYTES # BLD AUTO: 4 10E9/L (ref 0.8–5.3)
LYMPHOCYTES NFR BLD AUTO: 40.6 %
MCH RBC QN AUTO: 28.3 PG (ref 26.5–33)
MCHC RBC AUTO-ENTMCNC: 32 G/DL (ref 31.5–36.5)
MCV RBC AUTO: 88 FL (ref 78–100)
MONOCYTES # BLD AUTO: 0.7 10E9/L (ref 0–1.3)
MONOCYTES NFR BLD AUTO: 6.6 %
NEUTROPHILS # BLD AUTO: 5 10E9/L (ref 1.6–8.3)
NEUTROPHILS NFR BLD AUTO: 51.3 %
PLATELET # BLD AUTO: 360 10E9/L (ref 150–450)
POTASSIUM SERPL-SCNC: 3.7 MMOL/L (ref 3.4–5.3)
PROT SERPL-MCNC: 7 G/DL (ref 6.8–8.8)
RBC # BLD AUTO: 4.49 10E12/L (ref 3.8–5.2)
SODIUM SERPL-SCNC: 138 MMOL/L (ref 133–144)
WBC # BLD AUTO: 9.8 10E9/L (ref 4–11)

## 2019-10-07 PROCEDURE — 86665 EPSTEIN-BARR CAPSID VCA: CPT | Performed by: PHYSICIAN ASSISTANT

## 2019-10-07 PROCEDURE — 83690 ASSAY OF LIPASE: CPT | Performed by: PHYSICIAN ASSISTANT

## 2019-10-07 PROCEDURE — 82150 ASSAY OF AMYLASE: CPT | Performed by: PHYSICIAN ASSISTANT

## 2019-10-07 PROCEDURE — 86140 C-REACTIVE PROTEIN: CPT | Performed by: PHYSICIAN ASSISTANT

## 2019-10-07 PROCEDURE — 86665 EPSTEIN-BARR CAPSID VCA: CPT | Mod: 59 | Performed by: PHYSICIAN ASSISTANT

## 2019-10-07 PROCEDURE — 86644 CMV ANTIBODY: CPT | Performed by: PHYSICIAN ASSISTANT

## 2019-10-07 PROCEDURE — 99214 OFFICE O/P EST MOD 30 MIN: CPT | Performed by: PHYSICIAN ASSISTANT

## 2019-10-07 PROCEDURE — 86645 CMV ANTIBODY IGM: CPT | Performed by: PHYSICIAN ASSISTANT

## 2019-10-07 PROCEDURE — 36415 COLL VENOUS BLD VENIPUNCTURE: CPT | Performed by: PHYSICIAN ASSISTANT

## 2019-10-07 PROCEDURE — 80053 COMPREHEN METABOLIC PANEL: CPT | Performed by: PHYSICIAN ASSISTANT

## 2019-10-07 PROCEDURE — 86618 LYME DISEASE ANTIBODY: CPT | Performed by: PHYSICIAN ASSISTANT

## 2019-10-07 PROCEDURE — 85025 COMPLETE CBC W/AUTO DIFF WBC: CPT | Performed by: PHYSICIAN ASSISTANT

## 2019-10-07 NOTE — LETTER
October 7, 2019      Holland Gary  [address] [place] [address]        To Whom It May Concern:    Holland Gary  was seen on 10/7/19.  Please allow patient to return to work without restrictions.         Sincerely,        Teena Fields PA-C

## 2019-10-07 NOTE — PROGRESS NOTES
Subjective     Holland Gary is a 38 year old female who presents to clinic today for the following health issues: would like a referral for an US.    HPI   Acute Illness   Acute illness concerns: fatigue, URI symptoms   Onset: 1 month     Fever: no    Chills/Sweats: YES- sweats     Headache (location?): YES- eye pressure     Sinus Pressure:YES- tender and pressure     Conjunctivitis:  YES- water eye - left     Ear Pain: no    Rhinorrhea: YES    Congestion: no    Sore Throat: YES     Cough: no    Wheeze: no    Decreased Appetite: YES    Nausea: no    Vomiting: no    Diarrhea:  no    Dysuria/Freq.: no    Fatigue/Achiness: YES-both    Sick/Strep Exposure: no     Therapies Tried and outcome: Flonase     Review of Systems   ROS COMP: Constitutional, HEENT, cardiovascular, pulmonary, gi and gu systems are negative, except as otherwise noted.      Objective    /76 (BP Location: Right arm, Cuff Size: Adult Large)   Pulse 74   Temp 98  F (36.7  C) (Oral)   Wt 111.6 kg (246 lb)   SpO2 96%   BMI 40.94 kg/m    Body mass index is 40.94 kg/m .  Physical Exam   GENERAL: alert and no distress  EYES: Eyes grossly normal to inspection, PERRL and conjunctivae and sclerae normal  HENT: ear canals and TM's normal, nose and mouth without ulcers or lesions  NECK: no adenopathy  RESP: lungs clear to auscultation - no rales, rhonchi or wheezes  CV: regular rate and rhythm, normal S1 S2, no S3 or S4  ABDOMEN: soft, nontender, LUQ abdominal tenderness, no masses and bowel sounds normal    Diagnostic Test Results:  No results found for this or any previous visit (from the past 24 hour(s)).        Assessment & Plan   (R53.83) Fatigue, unspecified type  (primary encounter diagnosis)  Comment: obtain further labs. Patient may return to work.  Plan: CMV antibody IgM, CMV Antibody IgG, EBV Capsid         Antibody IgG, EBV Capsid Antibody IgM, CBC with        platelets differential, Comprehensive metabolic        panel, Lyme Disease Jyothi  with reflex to WB Serum            (R10.12) LUQ abdominal pain  Comment: if labs all normal, will proceed with imaging.  Plan: CRP inflammation, Amylase, Lipase            Teena Fields PA-C  [facility]

## 2019-10-08 LAB
B BURGDOR IGG+IGM SER QL: 0.14 (ref 0–0.89)
CMV IGG SERPL QL IA: >8 AI (ref 0–0.8)
CMV IGM SERPL QL IA: <0.2 AI (ref 0–0.8)
EBV VCA IGG SER QL IA: >8 AI (ref 0–0.8)
EBV VCA IGM SER QL IA: <0.2 AI (ref 0–0.8)

## 2019-10-10 ENCOUNTER — HOSPITAL ENCOUNTER (OUTPATIENT)
Dept: ULTRASOUND IMAGING | Facility: CLINIC | Age: 39
Discharge: HOME | End: 2019-10-10
Attending: PHYSICIAN ASSISTANT | Admitting: PHYSICIAN ASSISTANT
Payer: COMMERCIAL

## 2019-10-10 DIAGNOSIS — R10.12 LUQ ABDOMINAL PAIN: ICD-10-CM

## 2019-10-10 PROCEDURE — 76705 ECHO EXAM OF ABDOMEN: CPT

## 2019-10-12 ENCOUNTER — VIRTUAL VISIT (OUTPATIENT)
Dept: FAMILY MEDICINE | Facility: OTHER | Age: 39
End: 2019-10-12

## 2019-10-13 NOTE — PROGRESS NOTES
"Date: 10/12/2019 21:18:25  Clinician: Lou Jorge  Clinician NPI: [id number]  Patient: willy zepeda  Patient : 1980  Patient Address: [address] [place] [address]  Patient Phone: [phone]  Visit Protocol: Oral mouth sores  Patient Summary:  willy is a 38 year old ( : 1980 ) female who initiated a Visit for canker sores. When asked the question \"Please sign me up to receive news, health information and promotions from [employer].\", willy responded \"No\".    Images of her skin condition were uploaded.   Symptom details  Her symptoms started 1 to 3 days ago. The symptoms consist of pain.   The sores are located inside the mouth on one side of the face. The sores are located together, in a cluster. She has 1 to 2 sores.   willy denies having tingling and burning around the affected area. She also denies feeling feverish. willy is not experiencing similar rash or sores on other parts of the body.   Precipitating events  willy experienced pain or unusual sensations (such as itching, burning, or tingling) in the location of sores/rash before it appeared.   Pertinent medical history  willy has not had an outbreak in the past.   willy does not smoke or use smokeless tobacco.   She denies pregnancy and denies breastfeeding. She has menstruated in the past month.     MEDICATIONS: Daily Multi-Vitamin oral, Adderall oral, ALLERGIES: NKDA  Clinician Response:  Dear maria esthermi,  Based on the information provided, the lesions or sores you have are most likely cold sores, also known as fever blisters.  The technical term is 'herpes simplex virus type 1'. Common symptoms include a tingling sensation on the mouth or lips, fluid filled blisters, or crusting on the skin.  A person who has this condition can transmit the infection to others through direct contact. After you have had cold sores, the virus remains dormant in your skin cells and can come back at a later time causing another cold sore.   Medication " information  I am prescribing:     Valacyclovir 1 gram oral tablet. Take 2 tablets by mouth every 12 hours for 1 day. If you think your oral sores are returning, refill the medication and use it according to the instructions. Your prescription includes 1 refill.   If you become pregnant during this course of treatment with medication, stop taking the medication and contact your primary care provider.   Self care  To reduce the spread of the sores to other people and to other parts of your body, please take the following precautions:      Try not to itch or scratch the sores    If the lesions are draining, keep them covered    Be sure to wash your hands with soap and water often and after touching the sores    Wash towels and bed linens in hot water and dry them on high heat    When you have active sores:    Avoid direct contact such as kissing    Do not share silverware, cups, or towels           When to seek care  Please make an appointment to be seen in a clinic or urgent care if any of the following occur:     Your sores do not heal within 2 weeks    You develop new symptoms or your symptoms become worse      Diagnosis: Herpes Simplex (Cold Sores)  Diagnosis ICD: B00.9  Prescription: valacyclovir (Valtrex) 1 gram oral tablet 4 tablet, 1 days supply. Take 2 tablets by mouth every 12 hours for 1 day. Refills: 1, Refill as needed: no, Allow substitutions: yes  Pharmacy: [id number] - [phone] - [address] [place] [address]

## 2019-10-29 ENCOUNTER — VIRTUAL VISIT (OUTPATIENT)
Dept: FAMILY MEDICINE | Facility: OTHER | Age: 39
End: 2019-10-29

## 2019-10-29 NOTE — PROGRESS NOTES
"Date: 10/29/2019 18:05:55  Clinician: Rohith Diane  Clinician NPI: [id number]  Patient: willy zepeda  Patient : 1980  Patient Address: [address] [place] [address]  Patient Phone: [phone]  Visit Protocol: Oral mouth sores  Patient Summary:  willy is a 38 year old ( : 1980 ) female who initiated a Visit for cold sores. When asked the question \"Please sign me up to receive news, health information and promotions from [employer].\", willy responded \"No\".    Images of her skin condition were uploaded.   willy has previously been diagnosed with cold sores (herpes simplex 1).   Symptom details  willy has active sores. Her symptoms started 1 to 3 days ago. The symptoms consist of pain, tingling, and burning.   The sores are located on the lips and outside the mouth, near the lips on both sides of the face. She has 3 to 4 sores.   She denies feeling feverish. Her sores are not located in a cluster. willy is not experiencing similar rash or sores on other parts of the body.   Precipitating events  willy experienced pain or unusual sensations (such as itching, burning, or tingling) in the location of sores/rash before it appeared.   Pertinent medical history  Her last outbreak was 2 to 4 weeks ago. She has had 2 outbreak(s) in the past three months. Her sores are typically non-recurrent.   willy has taken some medication other than listed to treat the sores in the past.   Other medications used to treat sores in the past as reported by the patient (free text): Can not remember the name, I believe it was a chewable, or dissolved in your mouth, it did not seem to help at all.  willy has not taken oral antivirals for long-term suppression of the oral sores in the past 12 months.   willy does not smoke or use smokeless tobacco.   She denies pregnancy and denies breastfeeding. She has menstruated in the past month.   Additional information as reported by the patient (free text): Typically I get 1 sore, " or clusters together, this time it started as 1 small sore above the middle of my upper lip. I used Oleavicin to treat it, and then got a huge bump on my upper lip (left side, commonly where I get them) and then another bump started on my lower right side, and now another bump is forming next to the big bump on the upper left side, and on to the skin. I have switched to blistex (white gel) as that usually keeps it pretty well managed, but I think I need something stronger     MEDICATIONS: Daily Multi-Vitamin oral, Adderall oral, ALLERGIES: NKDA  Clinician Response:  Dear willy,  Based on the information provided, the lesions or sores you have are most likely cold sores, also known as fever blisters.  The technical term is 'herpes simplex virus type 1'. Common symptoms include a tingling sensation on the mouth or lips, fluid filled blisters, or crusting on the skin.  A person who has this condition can transmit the infection to others through direct contact. After you have had cold sores, the virus remains dormant in your skin cells and can come back at a later time causing another cold sore.   Medication information  I am prescribing:     Valacyclovir 1 gram oral tablet. Take 2 tablets by mouth every 12 hours for 1 day. If you think your oral sores are returning, refill the medication and use it according to the instructions. Your prescription includes 1 refill.   If you become pregnant during this course of treatment with medication, stop taking the medication and contact your primary care provider.   Self care  To reduce the spread of the sores to other people and to other parts of your body, please take the following precautions:      Try not to itch or scratch the sores    If the lesions are draining, keep them covered    Be sure to wash your hands with soap and water often and after touching the sores    Wash towels and bed linens in hot water and dry them on high heat    When you have active sores:    Avoid  direct contact such as kissing    Do not share silverware, cups, or towels           When to seek care  Please make an appointment to be seen in a clinic or urgent care if any of the following occur:     Your sores do not heal within 2 weeks    You develop new symptoms or your symptoms become worse      Diagnosis: Herpes Simplex (Cold Sores)  Diagnosis ICD: B00.9  Prescription: valacyclovir (Valtrex) 1 gram oral tablet 4 tablet, 1 days supply. Take 2 tablets by mouth every 12 hours for 1 day. Refills: 1, Refill as needed: no, Allow substitutions: yes  Pharmacy: [id number] - [phone] - [address] [place] [address]

## 2021-01-15 ENCOUNTER — HEALTH MAINTENANCE LETTER (OUTPATIENT)
Age: 41
End: 2021-01-15

## 2021-09-05 ENCOUNTER — HEALTH MAINTENANCE LETTER (OUTPATIENT)
Age: 41
End: 2021-09-05

## 2021-11-11 ENCOUNTER — HOSPITAL ENCOUNTER (OUTPATIENT)
Dept: MAMMOGRAPHY | Facility: CLINIC | Age: 41
Discharge: HOME | End: 2021-11-11
Attending: NURSE PRACTITIONER | Admitting: NURSE PRACTITIONER
Payer: COMMERCIAL

## 2021-11-11 DIAGNOSIS — Z12.31 VISIT FOR SCREENING MAMMOGRAM: ICD-10-CM

## 2021-11-11 PROCEDURE — 77063 BREAST TOMOSYNTHESIS BI: CPT

## 2021-11-30 ENCOUNTER — OFFICE VISIT (OUTPATIENT)
Dept: PEDIATRICS | Facility: CLINIC | Age: 41
End: 2021-11-30
Payer: COMMERCIAL

## 2021-11-30 VITALS
HEART RATE: 71 BPM | TEMPERATURE: 97.9 F | OXYGEN SATURATION: 97 % | SYSTOLIC BLOOD PRESSURE: 102 MMHG | WEIGHT: 232.7 LBS | DIASTOLIC BLOOD PRESSURE: 69 MMHG | BODY MASS INDEX: 41.23 KG/M2 | HEIGHT: 63 IN | RESPIRATION RATE: 16 BRPM

## 2021-11-30 DIAGNOSIS — Z11.51 SCREENING FOR HUMAN PAPILLOMAVIRUS: ICD-10-CM

## 2021-11-30 DIAGNOSIS — G50.0 TRIGEMINAL NEURALGIA: ICD-10-CM

## 2021-11-30 DIAGNOSIS — F33.0 MILD EPISODE OF RECURRENT MAJOR DEPRESSIVE DISORDER (H): ICD-10-CM

## 2021-11-30 DIAGNOSIS — Z11.3 SCREEN FOR STD (SEXUALLY TRANSMITTED DISEASE): ICD-10-CM

## 2021-11-30 DIAGNOSIS — K64.9 HEMORRHOIDS, UNSPECIFIED HEMORRHOID TYPE: ICD-10-CM

## 2021-11-30 DIAGNOSIS — F98.8 ATTENTION DEFICIT DISORDER (ADD) WITHOUT HYPERACTIVITY: ICD-10-CM

## 2021-11-30 DIAGNOSIS — Z00.00 ROUTINE GENERAL MEDICAL EXAMINATION AT A HEALTH CARE FACILITY: Primary | ICD-10-CM

## 2021-11-30 DIAGNOSIS — Z12.4 SCREENING FOR CERVICAL CANCER: ICD-10-CM

## 2021-11-30 DIAGNOSIS — E66.01 MORBID OBESITY (H): ICD-10-CM

## 2021-11-30 LAB — CRP SERPL-MCNC: 7.6 MG/L (ref 0–8)

## 2021-11-30 PROCEDURE — 80061 LIPID PANEL: CPT | Performed by: NURSE PRACTITIONER

## 2021-11-30 PROCEDURE — 86780 TREPONEMA PALLIDUM: CPT | Performed by: NURSE PRACTITIONER

## 2021-11-30 PROCEDURE — 87624 HPV HI-RISK TYP POOLED RSLT: CPT | Performed by: NURSE PRACTITIONER

## 2021-11-30 PROCEDURE — 87389 HIV-1 AG W/HIV-1&-2 AB AG IA: CPT | Performed by: NURSE PRACTITIONER

## 2021-11-30 PROCEDURE — 86140 C-REACTIVE PROTEIN: CPT | Performed by: NURSE PRACTITIONER

## 2021-11-30 PROCEDURE — 80053 COMPREHEN METABOLIC PANEL: CPT | Performed by: NURSE PRACTITIONER

## 2021-11-30 PROCEDURE — 87591 N.GONORRHOEAE DNA AMP PROB: CPT | Performed by: NURSE PRACTITIONER

## 2021-11-30 PROCEDURE — 99396 PREV VISIT EST AGE 40-64: CPT | Performed by: NURSE PRACTITIONER

## 2021-11-30 PROCEDURE — 36415 COLL VENOUS BLD VENIPUNCTURE: CPT | Performed by: NURSE PRACTITIONER

## 2021-11-30 PROCEDURE — 99214 OFFICE O/P EST MOD 30 MIN: CPT | Mod: 25 | Performed by: NURSE PRACTITIONER

## 2021-11-30 PROCEDURE — 87491 CHLMYD TRACH DNA AMP PROBE: CPT | Performed by: NURSE PRACTITIONER

## 2021-11-30 PROCEDURE — G0145 SCR C/V CYTO,THINLAYER,RESCR: HCPCS | Performed by: NURSE PRACTITIONER

## 2021-11-30 RX ORDER — CARBAMAZEPINE 200 MG/1
TABLET ORAL
Qty: 20 TABLET | Refills: 3 | Status: SHIPPED | OUTPATIENT
Start: 2021-11-30

## 2021-11-30 RX ORDER — DEXTROAMPHETAMINE SACCHARATE, AMPHETAMINE ASPARTATE MONOHYDRATE, DEXTROAMPHETAMINE SULFATE AND AMPHETAMINE SULFATE 5; 5; 5; 5 MG/1; MG/1; MG/1; MG/1
20 CAPSULE, EXTENDED RELEASE ORAL DAILY
Qty: 30 CAPSULE | Refills: 0 | Status: SHIPPED | OUTPATIENT
Start: 2022-01-31 | End: 2022-03-02

## 2021-11-30 RX ORDER — DEXTROAMPHETAMINE SACCHARATE, AMPHETAMINE ASPARTATE MONOHYDRATE, DEXTROAMPHETAMINE SULFATE AND AMPHETAMINE SULFATE 5; 5; 5; 5 MG/1; MG/1; MG/1; MG/1
20 CAPSULE, EXTENDED RELEASE ORAL DAILY
Qty: 30 CAPSULE | Refills: 0 | Status: SHIPPED | OUTPATIENT
Start: 2021-11-30 | End: 2021-12-30

## 2021-11-30 RX ORDER — DEXTROAMPHETAMINE SACCHARATE, AMPHETAMINE ASPARTATE, DEXTROAMPHETAMINE SULFATE AND AMPHETAMINE SULFATE 2.5; 2.5; 2.5; 2.5 MG/1; MG/1; MG/1; MG/1
10 TABLET ORAL 2 TIMES DAILY
Qty: 60 TABLET | Refills: 0 | Status: SHIPPED | OUTPATIENT
Start: 2022-01-31 | End: 2022-03-02

## 2021-11-30 RX ORDER — DEXTROAMPHETAMINE SACCHARATE, AMPHETAMINE ASPARTATE MONOHYDRATE, DEXTROAMPHETAMINE SULFATE AND AMPHETAMINE SULFATE 5; 5; 5; 5 MG/1; MG/1; MG/1; MG/1
20 CAPSULE, EXTENDED RELEASE ORAL DAILY
Qty: 30 CAPSULE | Refills: 0 | Status: SHIPPED | OUTPATIENT
Start: 2021-12-31 | End: 2022-01-30

## 2021-11-30 RX ORDER — DEXTROAMPHETAMINE SACCHARATE, AMPHETAMINE ASPARTATE, DEXTROAMPHETAMINE SULFATE AND AMPHETAMINE SULFATE 2.5; 2.5; 2.5; 2.5 MG/1; MG/1; MG/1; MG/1
10 TABLET ORAL 2 TIMES DAILY
Qty: 60 TABLET | Refills: 0 | Status: SHIPPED | OUTPATIENT
Start: 2021-12-31 | End: 2022-01-30

## 2021-11-30 RX ORDER — DEXTROAMPHETAMINE SACCHARATE, AMPHETAMINE ASPARTATE, DEXTROAMPHETAMINE SULFATE AND AMPHETAMINE SULFATE 2.5; 2.5; 2.5; 2.5 MG/1; MG/1; MG/1; MG/1
10 TABLET ORAL 2 TIMES DAILY
Qty: 60 TABLET | Refills: 0 | Status: SHIPPED | OUTPATIENT
Start: 2021-11-30 | End: 2021-12-30

## 2021-11-30 SDOH — HEALTH STABILITY: PHYSICAL HEALTH: ON AVERAGE, HOW MANY DAYS PER WEEK DO YOU ENGAGE IN MODERATE TO STRENUOUS EXERCISE (LIKE A BRISK WALK)?: 1 DAY

## 2021-11-30 SDOH — ECONOMIC STABILITY: FOOD INSECURITY: WITHIN THE PAST 12 MONTHS, YOU WORRIED THAT YOUR FOOD WOULD RUN OUT BEFORE YOU GOT MONEY TO BUY MORE.: SOMETIMES TRUE

## 2021-11-30 SDOH — ECONOMIC STABILITY: INCOME INSECURITY: IN THE LAST 12 MONTHS, WAS THERE A TIME WHEN YOU WERE NOT ABLE TO PAY THE MORTGAGE OR RENT ON TIME?: YES

## 2021-11-30 SDOH — ECONOMIC STABILITY: TRANSPORTATION INSECURITY
IN THE PAST 12 MONTHS, HAS LACK OF TRANSPORTATION KEPT YOU FROM MEETINGS, WORK, OR FROM GETTING THINGS NEEDED FOR DAILY LIVING?: NO

## 2021-11-30 SDOH — ECONOMIC STABILITY: FOOD INSECURITY: WITHIN THE PAST 12 MONTHS, THE FOOD YOU BOUGHT JUST DIDN'T LAST AND YOU DIDN'T HAVE MONEY TO GET MORE.: SOMETIMES TRUE

## 2021-11-30 SDOH — ECONOMIC STABILITY: TRANSPORTATION INSECURITY
IN THE PAST 12 MONTHS, HAS THE LACK OF TRANSPORTATION KEPT YOU FROM MEDICAL APPOINTMENTS OR FROM GETTING MEDICATIONS?: NO

## 2021-11-30 SDOH — HEALTH STABILITY: PHYSICAL HEALTH: ON AVERAGE, HOW MANY MINUTES DO YOU ENGAGE IN EXERCISE AT THIS LEVEL?: 20 MIN

## 2021-11-30 SDOH — ECONOMIC STABILITY: INCOME INSECURITY: HOW HARD IS IT FOR YOU TO PAY FOR THE VERY BASICS LIKE FOOD, HOUSING, MEDICAL CARE, AND HEATING?: NOT VERY HARD

## 2021-11-30 ASSESSMENT — ENCOUNTER SYMPTOMS
HEMATOCHEZIA: 0
FREQUENCY: 1
JOINT SWELLING: 0
COUGH: 0
HEARTBURN: 1
EYE PAIN: 0
MYALGIAS: 1
BREAST MASS: 0
BREAST MASS: 1
DIARRHEA: 0
NERVOUS/ANXIOUS: 1
SORE THROAT: 0
ABDOMINAL PAIN: 0
PARESTHESIAS: 1
PALPITATIONS: 0
HEMATURIA: 0
DIZZINESS: 0
SHORTNESS OF BREATH: 0
FEVER: 0
HEADACHES: 0
PALPITATIONS: 1
CONSTIPATION: 1
WEAKNESS: 1
ARTHRALGIAS: 1
DYSURIA: 0
NAUSEA: 0
CHILLS: 0

## 2021-11-30 ASSESSMENT — ANXIETY QUESTIONNAIRES
2. NOT BEING ABLE TO STOP OR CONTROL WORRYING: NOT AT ALL
5. BEING SO RESTLESS THAT IT IS HARD TO SIT STILL: NEARLY EVERY DAY
3. WORRYING TOO MUCH ABOUT DIFFERENT THINGS: SEVERAL DAYS
7. FEELING AFRAID AS IF SOMETHING AWFUL MIGHT HAPPEN: NOT AT ALL
6. BECOMING EASILY ANNOYED OR IRRITABLE: NEARLY EVERY DAY
IF YOU CHECKED OFF ANY PROBLEMS ON THIS QUESTIONNAIRE, HOW DIFFICULT HAVE THESE PROBLEMS MADE IT FOR YOU TO DO YOUR WORK, TAKE CARE OF THINGS AT HOME, OR GET ALONG WITH OTHER PEOPLE: SOMEWHAT DIFFICULT
GAD7 TOTAL SCORE: 12
1. FEELING NERVOUS, ANXIOUS, OR ON EDGE: MORE THAN HALF THE DAYS

## 2021-11-30 ASSESSMENT — SOCIAL DETERMINANTS OF HEALTH (SDOH)
HOW OFTEN DO YOU ATTEND CHURCH OR RELIGIOUS SERVICES?: NEVER
DO YOU BELONG TO ANY CLUBS OR ORGANIZATIONS SUCH AS CHURCH GROUPS UNIONS, FRATERNAL OR ATHLETIC GROUPS, OR SCHOOL GROUPS?: NO
IN A TYPICAL WEEK, HOW MANY TIMES DO YOU TALK ON THE PHONE WITH FAMILY, FRIENDS, OR NEIGHBORS?: MORE THAN THREE TIMES A WEEK
HOW OFTEN DO YOU ATTEND CHURCH OR RELIGIOUS SERVICES?: NEVER
HOW OFTEN DO YOU GET TOGETHER WITH FRIENDS OR RELATIVES?: NEVER
IN A TYPICAL WEEK, HOW MANY TIMES DO YOU TALK ON THE PHONE WITH FAMILY, FRIENDS, OR NEIGHBORS?: MORE THAN THREE TIMES A WEEK
ARE YOU MARRIED, WIDOWED, DIVORCED, SEPARATED, NEVER MARRIED, OR LIVING WITH A PARTNER?: NEVER MARRIED
DO YOU BELONG TO ANY CLUBS OR ORGANIZATIONS SUCH AS CHURCH GROUPS UNIONS, FRATERNAL OR ATHLETIC GROUPS, OR SCHOOL GROUPS?: NO
ARE YOU MARRIED, WIDOWED, DIVORCED, SEPARATED, NEVER MARRIED, OR LIVING WITH A PARTNER?: NEVER MARRIED
HOW OFTEN DO YOU GET TOGETHER WITH FRIENDS OR RELATIVES?: NEVER

## 2021-11-30 ASSESSMENT — LIFESTYLE VARIABLES
HOW MANY STANDARD DRINKS CONTAINING ALCOHOL DO YOU HAVE ON A TYPICAL DAY: 1 OR 2
HOW OFTEN DO YOU HAVE A DRINK CONTAINING ALCOHOL: MONTHLY OR LESS
HOW OFTEN DO YOU HAVE SIX OR MORE DRINKS ON ONE OCCASION: LESS THAN MONTHLY

## 2021-11-30 ASSESSMENT — PATIENT HEALTH QUESTIONNAIRE - PHQ9: 5. POOR APPETITE OR OVEREATING: NEARLY EVERY DAY

## 2021-11-30 ASSESSMENT — MIFFLIN-ST. JEOR: SCORE: 1689.65

## 2021-11-30 NOTE — PATIENT INSTRUCTIONS
You will receive a call to schedule with neurology    I refilled your Adderall. Follow up with me in 6 months for ongoing refills. This can be an E-visit or virtual visit such as phone or video          Preventive Health Recommendations  Female Ages 40 to 49    Yearly exam:     See your health care provider every year in order to  1. Review health changes.   2. Discuss preventive care.    3. Review your medicines if your doctor prescribed any.      Get a Pap test every three years (unless you have an abnormal result and your provider advises testing more often).      If you get Pap tests with HPV test, you only need to test every 5 years, unless you have an abnormal result. You do not need a Pap test if your uterus was removed (hysterectomy) and you have not had cancer.      You should be tested each year for STDs (sexually transmitted diseases), if you're at risk.     Ask your doctor if you should have a mammogram.      Have a colonoscopy (test for colon cancer) if someone in your family has had colon cancer or polyps before age 50.       Have a cholesterol test every 5 years.       Have a diabetes test (fasting glucose) after age 45. If you are at risk for diabetes, you should have this test every 3 years.    Shots: Get a flu shot each year. Get a tetanus shot every 10 years.     Nutrition:     Eat at least 5 servings of fruits and vegetables each day.    Eat whole-grain bread, whole-wheat pasta and brown rice instead of white grains and rice.    Get adequate Calcium and Vitamin D.      Lifestyle    Exercise at least 150 minutes a week (an average of 30 minutes a day, 5 days a week). This will help you control your weight and prevent disease.    Limit alcohol to one drink per day.    No smoking.     Wear sunscreen to prevent skin cancer.    See your dentist every six months for an exam and cleaning.
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
## 2021-12-01 LAB
ALBUMIN SERPL-MCNC: 3.4 G/DL (ref 3.4–5)
ALP SERPL-CCNC: 65 U/L (ref 40–150)
ALT SERPL W P-5'-P-CCNC: 17 U/L (ref 0–50)
ANION GAP SERPL CALCULATED.3IONS-SCNC: 6 MMOL/L (ref 3–14)
AST SERPL W P-5'-P-CCNC: 11 U/L (ref 0–45)
BILIRUB SERPL-MCNC: 0.2 MG/DL (ref 0.2–1.3)
BUN SERPL-MCNC: 12 MG/DL (ref 7–30)
C TRACH DNA SPEC QL NAA+PROBE: NEGATIVE
CALCIUM SERPL-MCNC: 9.3 MG/DL (ref 8.5–10.1)
CHLORIDE BLD-SCNC: 103 MMOL/L (ref 94–109)
CHOLEST SERPL-MCNC: 230 MG/DL
CO2 SERPL-SCNC: 26 MMOL/L (ref 20–32)
CREAT SERPL-MCNC: 0.76 MG/DL (ref 0.52–1.04)
FASTING STATUS PATIENT QL REPORTED: YES
GFR SERPL CREATININE-BSD FRML MDRD: >90 ML/MIN/1.73M2
GLUCOSE BLD-MCNC: 73 MG/DL (ref 70–99)
HDLC SERPL-MCNC: 67 MG/DL
HIV 1+2 AB+HIV1 P24 AG SERPL QL IA: NONREACTIVE
LDLC SERPL CALC-MCNC: 137 MG/DL
N GONORRHOEA DNA SPEC QL NAA+PROBE: NEGATIVE
NONHDLC SERPL-MCNC: 163 MG/DL
POTASSIUM BLD-SCNC: 4.3 MMOL/L (ref 3.4–5.3)
PROT SERPL-MCNC: 7.5 G/DL (ref 6.8–8.8)
SODIUM SERPL-SCNC: 135 MMOL/L (ref 133–144)
T PALLIDUM AB SER QL: NONREACTIVE
TRIGL SERPL-MCNC: 132 MG/DL

## 2021-12-01 ASSESSMENT — PATIENT HEALTH QUESTIONNAIRE - PHQ9: SUM OF ALL RESPONSES TO PHQ QUESTIONS 1-9: 17

## 2021-12-01 ASSESSMENT — ANXIETY QUESTIONNAIRES: GAD7 TOTAL SCORE: 12

## 2021-12-02 LAB
BKR LAB AP GYN ADEQUACY: NORMAL
BKR LAB AP GYN INTERPRETATION: NORMAL
BKR LAB AP HPV REFLEX: NORMAL
BKR LAB AP LMP: NORMAL
BKR LAB AP PREVIOUS ABNORMAL: NORMAL
PATH REPORT.COMMENTS IMP SPEC: NORMAL
PATH REPORT.COMMENTS IMP SPEC: NORMAL
PATH REPORT.RELEVANT HX SPEC: NORMAL

## 2021-12-06 LAB
HUMAN PAPILLOMA VIRUS 16 DNA: NEGATIVE
HUMAN PAPILLOMA VIRUS 18 DNA: NEGATIVE
HUMAN PAPILLOMA VIRUS FINAL DIAGNOSIS: NORMAL
HUMAN PAPILLOMA VIRUS OTHER HR: NEGATIVE

## 2022-05-31 NOTE — PATIENT INSTRUCTIONS
Preventive Health Recommendations  Female Ages 26 - 39  Yearly exam:   See your health care provider every year in order to    Review health changes.     Discuss preventive care.      Review your medicines if you your doctor has prescribed any.    Until age 30: Get a Pap test every three years (more often if you have had an abnormal result).    After age 30: Talk to your doctor about whether you should have a Pap test every 3 years or have a Pap test with HPV screening every 5 years.   You do not need a Pap test if your uterus was removed (hysterectomy) and you have not had cancer.  You should be tested each year for STDs (sexually transmitted diseases), if you're at risk.   Talk to your provider about how often to have your cholesterol checked.  If you are at risk for diabetes, you should have a diabetes test (fasting glucose).  Shots: Get a flu shot each year. Get a tetanus shot every 10 years.   Nutrition:     Eat at least 5 servings of fruits and vegetables each day.    Eat whole-grain bread, whole-wheat pasta and brown rice instead of white grains and rice.    Get adequate Calcium and Vitamin D.     Lifestyle    Exercise at least 150 minutes a week (30 minutes a day, 5 days of the week). This will help you control your weight and prevent disease.    Limit alcohol to one drink per day.    No smoking.     Wear sunscreen to prevent skin cancer.    See your dentist every six months for an exam and cleaning.  ------------  1. Follow-up with orthopedics about baker's cyst  2. Labs today: cholesterol, diabetes screen, liver function, kidney function, CRP, ESR (other inflammation test), vitamin D levels, ccp antibody (other rheumatoid arthritis test)  3. Recommend get vision checked  4. For migraines - try Zomig as soon as start to get symptoms  - can take with a naproxen if not fully helping the symptoms  5. Pap today with HPV test  6. If labs ok, then can schedule with Rheumatology to discuss joint pains - can call  Addended by: HERBERTH COLUNGA on: 5/31/2022 04:14 PM     Modules accepted: Orders     to schedule

## 2022-06-05 NOTE — PATIENT INSTRUCTIONS
Follow up with a primary care provider in 7-10 days.   Trigeminal Neuralgia  You have trigeminal neuralgia. This is pain caused by irritation of the trigeminal nerve on your face. Symptoms include sudden, sharp pain in your head or face. It may feel like an electric shock. It can last for several seconds or minutes. It usually happens on only 1 side of your face. Pain may be triggered by things like moving your jaw or a touch on the skin of your face. The pain may be caused by something irritating the trigeminal nerve, such as a blood vessel pressing against it. But the exact cause of this problem often isn t known. Although it can be quite painful, the condition isn t dangerous.  Trigeminal neuralgia is often treated with medications. These include anti-seizure medications or antidepressants. Certain other treatments may also help. In some cases, you may need surgery.    Home care  The health care provider may prescribe medicines to help relieve and prevent pain. Take all medicines as directed. Please note that it may take several changes in dose and medicines before the right combination is found that controls the pain.  General care:    Plan to rest at home today.    Avoid any specific activities that seem to trigger the pain.    Over the next few weeks, keep a pain diary. Write down when your symptoms happen and how they feel. Certain activities such as touching your face, chewing, talking, or brushing your teeth may bring on the pain. Cold air can also trigger the pain. Make sure you write down any triggers and discuss these with your health care provider. This will help your provider make a plan for your treatment.  Follow-up care  Follow up with your health care provider as advised. If you were referred to a neurologist, be sure to make an appointment.  For more information on your condition, visit::    Facial Pain Association www.fpa-support.org  When to seek medical advice  Call your health care provider  right away if any of these occur:    Fever of 100.4 F (38 C) or higher    Headache with very stiff neck    You aren t able to keep liquids down (repeated vomiting)    Extreme drowsiness or confusion    Dizziness or fainting    A new feeling of weakness or numbness or tingling in your arm, leg, or face    Difficulty speaking or seeing       8114-9699 [employer]. [address] [place] [address]. All rights reserved. This information is not intended as a substitute for professional medical care. Always follow your healthcare professional's instructions.         Normal rate, regular rhythm.  Heart sounds S1, S2.  No murmurs, rubs or gallops.

## 2022-10-23 ENCOUNTER — HEALTH MAINTENANCE LETTER (OUTPATIENT)
Age: 42
End: 2022-10-23

## 2023-04-01 ENCOUNTER — HEALTH MAINTENANCE LETTER (OUTPATIENT)
Age: 43
End: 2023-04-01

## 2024-03-24 ENCOUNTER — HEALTH MAINTENANCE LETTER (OUTPATIENT)
Age: 44
End: 2024-03-24

## 2024-06-02 ENCOUNTER — HEALTH MAINTENANCE LETTER (OUTPATIENT)
Age: 44
End: 2024-06-02

## 2024-10-25 ASSESSMENT — PATIENT HEALTH QUESTIONNAIRE - PHQ9: SUM OF ALL RESPONSES TO PHQ QUESTIONS 1-9: 12
